# Patient Record
Sex: FEMALE | Race: ASIAN | NOT HISPANIC OR LATINO
[De-identification: names, ages, dates, MRNs, and addresses within clinical notes are randomized per-mention and may not be internally consistent; named-entity substitution may affect disease eponyms.]

---

## 2020-07-14 ENCOUNTER — RESULT REVIEW (OUTPATIENT)
Age: 25
End: 2020-07-14

## 2020-09-11 ENCOUNTER — OUTPATIENT (OUTPATIENT)
Dept: OUTPATIENT SERVICES | Facility: HOSPITAL | Age: 25
LOS: 1 days | End: 2020-09-11
Payer: COMMERCIAL

## 2020-09-11 ENCOUNTER — APPOINTMENT (OUTPATIENT)
Dept: ANTEPARTUM | Facility: CLINIC | Age: 25
End: 2020-09-11

## 2020-09-11 ENCOUNTER — EMERGENCY (EMERGENCY)
Facility: HOSPITAL | Age: 25
LOS: 1 days | Discharge: HOME | End: 2020-09-11
Attending: EMERGENCY MEDICINE
Payer: COMMERCIAL

## 2020-09-11 VITALS
HEART RATE: 82 BPM | HEIGHT: 67 IN | SYSTOLIC BLOOD PRESSURE: 132 MMHG | RESPIRATION RATE: 16 BRPM | WEIGHT: 164.91 LBS | DIASTOLIC BLOOD PRESSURE: 82 MMHG | TEMPERATURE: 98 F | OXYGEN SATURATION: 95 %

## 2020-09-11 VITALS
SYSTOLIC BLOOD PRESSURE: 111 MMHG | DIASTOLIC BLOOD PRESSURE: 69 MMHG | OXYGEN SATURATION: 99 % | RESPIRATION RATE: 18 BRPM | TEMPERATURE: 98 F | HEART RATE: 74 BPM

## 2020-09-11 DIAGNOSIS — O26.899 OTHER SPECIFIED PREGNANCY RELATED CONDITIONS, UNSPECIFIED TRIMESTER: ICD-10-CM

## 2020-09-11 DIAGNOSIS — O99.89 OTHER SPECIFIED DISEASES AND CONDITIONS COMPLICATING PREGNANCY, CHILDBIRTH AND THE PUERPERIUM: ICD-10-CM

## 2020-09-11 DIAGNOSIS — Z3A.00 WEEKS OF GESTATION OF PREGNANCY NOT SPECIFIED: ICD-10-CM

## 2020-09-11 LAB
ALBUMIN SERPL ELPH-MCNC: 4 G/DL — SIGNIFICANT CHANGE UP (ref 3.3–5)
ALP SERPL-CCNC: 42 U/L — SIGNIFICANT CHANGE UP (ref 40–120)
ALT FLD-CCNC: 9 U/L — LOW (ref 10–45)
ANION GAP SERPL CALC-SCNC: 13 MMOL/L — SIGNIFICANT CHANGE UP (ref 5–17)
APPEARANCE UR: ABNORMAL
AST SERPL-CCNC: 13 U/L — SIGNIFICANT CHANGE UP (ref 10–40)
BACTERIA # UR AUTO: NEGATIVE — SIGNIFICANT CHANGE UP
BASOPHILS # BLD AUTO: 0.03 K/UL — SIGNIFICANT CHANGE UP (ref 0–0.2)
BASOPHILS NFR BLD AUTO: 0.3 % — SIGNIFICANT CHANGE UP (ref 0–2)
BILIRUB SERPL-MCNC: 0.2 MG/DL — SIGNIFICANT CHANGE UP (ref 0.2–1.2)
BILIRUB UR-MCNC: NEGATIVE — SIGNIFICANT CHANGE UP
BLD GP AB SCN SERPL QL: NEGATIVE — SIGNIFICANT CHANGE UP
BUN SERPL-MCNC: 4 MG/DL — LOW (ref 7–23)
CALCIUM SERPL-MCNC: 9.4 MG/DL — SIGNIFICANT CHANGE UP (ref 8.4–10.5)
CHLORIDE SERPL-SCNC: 102 MMOL/L — SIGNIFICANT CHANGE UP (ref 96–108)
CO2 SERPL-SCNC: 21 MMOL/L — LOW (ref 22–31)
COLOR SPEC: SIGNIFICANT CHANGE UP
CREAT SERPL-MCNC: 0.43 MG/DL — LOW (ref 0.5–1.3)
DIFF PNL FLD: NEGATIVE — SIGNIFICANT CHANGE UP
EOSINOPHIL # BLD AUTO: 0.09 K/UL — SIGNIFICANT CHANGE UP (ref 0–0.5)
EOSINOPHIL NFR BLD AUTO: 0.9 % — SIGNIFICANT CHANGE UP (ref 0–6)
EPI CELLS # UR: 1 /HPF — SIGNIFICANT CHANGE UP
GLUCOSE SERPL-MCNC: 98 MG/DL — SIGNIFICANT CHANGE UP (ref 70–99)
GLUCOSE UR QL: NEGATIVE — SIGNIFICANT CHANGE UP
HCT VFR BLD CALC: 36 % — SIGNIFICANT CHANGE UP (ref 34.5–45)
HGB BLD-MCNC: 12.1 G/DL — SIGNIFICANT CHANGE UP (ref 11.5–15.5)
HYALINE CASTS # UR AUTO: 1 /LPF — SIGNIFICANT CHANGE UP (ref 0–2)
IMM GRANULOCYTES NFR BLD AUTO: 0.6 % — SIGNIFICANT CHANGE UP (ref 0–1.5)
KETONES UR-MCNC: NEGATIVE — SIGNIFICANT CHANGE UP
LEUKOCYTE ESTERASE UR-ACNC: NEGATIVE — SIGNIFICANT CHANGE UP
LYMPHOCYTES # BLD AUTO: 1.49 K/UL — SIGNIFICANT CHANGE UP (ref 1–3.3)
LYMPHOCYTES # BLD AUTO: 15.7 % — SIGNIFICANT CHANGE UP (ref 13–44)
MCHC RBC-ENTMCNC: 29.7 PG — SIGNIFICANT CHANGE UP (ref 27–34)
MCHC RBC-ENTMCNC: 33.6 GM/DL — SIGNIFICANT CHANGE UP (ref 32–36)
MCV RBC AUTO: 88.5 FL — SIGNIFICANT CHANGE UP (ref 80–100)
MONOCYTES # BLD AUTO: 0.77 K/UL — SIGNIFICANT CHANGE UP (ref 0–0.9)
MONOCYTES NFR BLD AUTO: 8.1 % — SIGNIFICANT CHANGE UP (ref 2–14)
NEUTROPHILS # BLD AUTO: 7.05 K/UL — SIGNIFICANT CHANGE UP (ref 1.8–7.4)
NEUTROPHILS NFR BLD AUTO: 74.4 % — SIGNIFICANT CHANGE UP (ref 43–77)
NITRITE UR-MCNC: NEGATIVE — SIGNIFICANT CHANGE UP
NRBC # BLD: 0 /100 WBCS — SIGNIFICANT CHANGE UP (ref 0–0)
PH UR: 7.5 — SIGNIFICANT CHANGE UP (ref 5–8)
PLATELET # BLD AUTO: 231 K/UL — SIGNIFICANT CHANGE UP (ref 150–400)
POTASSIUM SERPL-MCNC: 3.8 MMOL/L — SIGNIFICANT CHANGE UP (ref 3.5–5.3)
POTASSIUM SERPL-SCNC: 3.8 MMOL/L — SIGNIFICANT CHANGE UP (ref 3.5–5.3)
PROT SERPL-MCNC: 6.8 G/DL — SIGNIFICANT CHANGE UP (ref 6–8.3)
PROT UR-MCNC: NEGATIVE — SIGNIFICANT CHANGE UP
RBC # BLD: 4.07 M/UL — SIGNIFICANT CHANGE UP (ref 3.8–5.2)
RBC # FLD: 13.2 % — SIGNIFICANT CHANGE UP (ref 10.3–14.5)
RBC CASTS # UR COMP ASSIST: 1 /HPF — SIGNIFICANT CHANGE UP (ref 0–4)
RH IG SCN BLD-IMP: POSITIVE — SIGNIFICANT CHANGE UP
SODIUM SERPL-SCNC: 136 MMOL/L — SIGNIFICANT CHANGE UP (ref 135–145)
SP GR SPEC: 1.01 — SIGNIFICANT CHANGE UP (ref 1.01–1.02)
UROBILINOGEN FLD QL: NEGATIVE — SIGNIFICANT CHANGE UP
WBC # BLD: 9.49 K/UL — SIGNIFICANT CHANGE UP (ref 3.8–10.5)
WBC # FLD AUTO: 9.49 K/UL — SIGNIFICANT CHANGE UP (ref 3.8–10.5)
WBC UR QL: 2 /HPF — SIGNIFICANT CHANGE UP (ref 0–5)

## 2020-09-11 PROCEDURE — 85025 COMPLETE CBC W/AUTO DIFF WBC: CPT

## 2020-09-11 PROCEDURE — 80053 COMPREHEN METABOLIC PANEL: CPT

## 2020-09-11 PROCEDURE — 86900 BLOOD TYPING SEROLOGIC ABO: CPT

## 2020-09-11 PROCEDURE — G0463: CPT

## 2020-09-11 PROCEDURE — 99285 EMERGENCY DEPT VISIT HI MDM: CPT

## 2020-09-11 PROCEDURE — 76805 OB US >/= 14 WKS SNGL FETUS: CPT

## 2020-09-11 PROCEDURE — 81001 URINALYSIS AUTO W/SCOPE: CPT

## 2020-09-11 PROCEDURE — 76805 OB US >/= 14 WKS SNGL FETUS: CPT | Mod: 26

## 2020-09-11 PROCEDURE — 99284 EMERGENCY DEPT VISIT MOD MDM: CPT | Mod: 25

## 2020-09-11 PROCEDURE — 87086 URINE CULTURE/COLONY COUNT: CPT

## 2020-09-11 PROCEDURE — 86850 RBC ANTIBODY SCREEN: CPT

## 2020-09-11 PROCEDURE — 86901 BLOOD TYPING SEROLOGIC RH(D): CPT

## 2020-09-11 RX ORDER — INDOMETHACIN 50 MG
1 CAPSULE ORAL
Qty: 8 | Refills: 0
Start: 2020-09-11 | End: 2020-09-12

## 2020-09-11 NOTE — ED ADULT NURSE NOTE - OBJECTIVE STATEMENT
25 year old female presenting to ED c/o abdominal pain. Pt A+Ox3, moves all four extremities, 17 weeks pregnant. Pt reports headache and 3/10 intermittent lower abdominal pain since 10pm last night, with waves of worsening pain. Pt denies taking pain medication or experiencing this pain in the past; reports movement exacerbates the pain, and sitting up alleviates it. Pt denies dizziness/lightheadedness, chest pain, SOB, N/V, urinary or bowel symptoms, fevers or chills. 25 year old female presenting to ED c/o abdominal pain. Pt A+Ox3, moves all four extremities, 17 weeks pregnant. Pt reports headache, pain while urinating, and 3/10 intermittent lower abdominal pain since 10pm last night, with waves of worsening pain. Pt denies taking pain medication or experiencing this pain in the past; reports movement exacerbates the pain, and sitting up alleviates it. Upon assessment, lowe abdomen/bladder tender to palpation. Pt denies dizziness/lightheadedness, chest pain, SOB, N/V, urinary or bowel symptoms, fevers or chills.

## 2020-09-11 NOTE — ED PROVIDER NOTE - PHYSICAL EXAMINATION
GENERAL: A&Ox3, non-toxic appearing, no acute distress  HEENT: NCAT, EOMI, oral mucosa moist, normal conjunctiva  RESP: CTAB, no respiratory distress, no wheezes/rhonchi/rales, speaking in full sentences  CV: RRR, no murmurs/rubs/gallops  ABDOMEN: soft, suprapubic discomfort, non-distended, no guarding  MSK: no visible deformities  NEURO: no focal sensory or motor deficits, MAEx4, steady gait  SKIN: warm, normal color, well perfused, no rash  PSYCH: normal affect    -Reinaldo Cooley, PGY2

## 2020-09-11 NOTE — ED ADULT NURSE NOTE - NS ED NURSE NOTE DISPO AOU DETAILS FT
patient d/c from ED and to be sent to L&D for cervical lengthening measurements. report given to Christa Rajput RN in L&D.
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
## 2020-09-11 NOTE — ED PROVIDER NOTE - PATIENT PORTAL LINK FT
You can access the FollowMyHealth Patient Portal offered by [facility] by registering at the following website: [email or web address]. By joining [employer]’s FollowMyHealth portal, you will also be able to view your health information using other applications (apps) compatible with our system.
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
## 2020-09-11 NOTE — ED PROVIDER NOTE - NSFOLLOWUPINSTRUCTIONS_ED_ALL_ED_FT
You were seen in the ED for abdominal pain. You underwent a transvaginal ultrasound and assessment by the OBGYN doctors, who recommended that you be transferred to the Labor and Delivery for further assessment and monitoring. You were seen in the ED for abdominal pain. You underwent a transvaginal ultrasound and assessment by the OBGYN doctors, who recommended that you be transferred to the Labor and Delivery service for further assessment and monitoring.

## 2020-09-11 NOTE — ED ADULT TRIAGE NOTE - CHIEF COMPLAINT QUOTE
c/o abdominal cramping, no bleeding; 17 weeks pregnant Island Pedicle Flap-Requiring Vessel Identification Text: The defect edges were debeveled with a #15 scalpel blade.  Given the location of the defect, shape of the defect and the proximity to free margins an island pedicle advancement flap was deemed most appropriate.  Using a sterile surgical marker, an appropriate advancement flap was drawn, based on the axial vessel mentioned above, incorporating the defect, outlining the appropriate donor tissue and placing the expected incisions within the relaxed skin tension lines where possible.    The area thus outlined was incised deep to adipose tissue with a #15 scalpel blade.  The skin margins were undermined to an appropriate distance in all directions around the primary defect and laterally outward around the island pedicle utilizing iris scissors.  There was minimal undermining beneath the pedicle flap.
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
## 2020-09-11 NOTE — CONSULT NOTE ADULT - PROBLEM SELECTOR RECOMMENDATION 9
-patient counseled on risk vs benefits of intervention at pre-viable gestational ages  -follow up with final recs pending Sonogram    Daniel Kirkland, PGY2 -patient counseled on risk vs benefits of intervention at pre-viable gestational ages  -recommend sending patient to L&D Triage for cervical length and tocometry    d/w Dr. Marc Kirkland, PGY2

## 2020-09-11 NOTE — ED PROVIDER NOTE - NS ED ROS FT
GENERAL: no fever, no chills  EYES: no change in vision, no irritation  HEENT: no trouble swallowing or speaking  CARDIAC: no chest pain, no palpitations   PULMONARY: no cough, no shortness of breath, no wheezing  GI: +abdominal pain, no nausea, no vomiting, no diarrhea, no constipation  : no changes in urination, +dysuria, no frequency, no hematuria, no discharge  SKIN: no rashes  NEURO: no headache, no numbness, no weakness  MSK: no joint pain, no muscle pain, no back pain, no calf pain     -Reinaldo Cooley, PGY2

## 2020-09-12 LAB
CULTURE RESULTS: NO GROWTH — SIGNIFICANT CHANGE UP
SPECIMEN SOURCE: SIGNIFICANT CHANGE UP

## 2020-10-02 PROBLEM — Z00.00 ENCOUNTER FOR PREVENTIVE HEALTH EXAMINATION: Status: ACTIVE | Noted: 2020-10-02

## 2020-10-06 ENCOUNTER — APPOINTMENT (OUTPATIENT)
Dept: ANTEPARTUM | Facility: CLINIC | Age: 25
End: 2020-10-06
Payer: COMMERCIAL

## 2020-10-06 ENCOUNTER — ASOB RESULT (OUTPATIENT)
Age: 25
End: 2020-10-06

## 2020-10-06 ENCOUNTER — APPOINTMENT (OUTPATIENT)
Dept: ANTEPARTUM | Facility: CLINIC | Age: 25
End: 2020-10-06
Payer: MEDICARE

## 2020-10-06 PROCEDURE — 99241 OFFICE CONSULTATION NEW/ESTAB PATIENT 15 MIN: CPT | Mod: 25

## 2020-10-06 PROCEDURE — 76817 TRANSVAGINAL US OBSTETRIC: CPT

## 2020-10-06 PROCEDURE — 76811 OB US DETAILED SNGL FETUS: CPT

## 2020-12-24 ENCOUNTER — ASOB RESULT (OUTPATIENT)
Age: 25
End: 2020-12-24

## 2020-12-24 ENCOUNTER — TRANSCRIPTION ENCOUNTER (OUTPATIENT)
Age: 25
End: 2020-12-24

## 2020-12-24 ENCOUNTER — APPOINTMENT (OUTPATIENT)
Dept: MATERNAL FETAL MEDICINE | Facility: CLINIC | Age: 25
End: 2020-12-24
Payer: MEDICARE

## 2020-12-24 PROCEDURE — G0108 DIAB MANAGE TRN  PER INDIV: CPT | Mod: 95

## 2020-12-24 RX ORDER — BLOOD-GLUCOSE METER
KIT MISCELLANEOUS
Qty: 2 | Refills: 0 | Status: ACTIVE | COMMUNITY
Start: 2020-12-24 | End: 1900-01-01

## 2020-12-24 RX ORDER — BLOOD-GLUCOSE METER
W/DEVICE KIT MISCELLANEOUS
Qty: 1 | Refills: 0 | Status: ACTIVE | COMMUNITY
Start: 2020-12-24 | End: 1900-01-01

## 2020-12-24 RX ORDER — URINE ACETONE TEST STRIPS
STRIP MISCELLANEOUS
Qty: 1 | Refills: 0 | Status: ACTIVE | COMMUNITY
Start: 2020-12-24 | End: 1900-01-01

## 2020-12-24 RX ORDER — LANCETS 33 GAUGE
EACH MISCELLANEOUS
Qty: 1 | Refills: 5 | Status: ACTIVE | COMMUNITY
Start: 2020-12-24 | End: 1900-01-01

## 2020-12-30 ENCOUNTER — APPOINTMENT (OUTPATIENT)
Dept: MATERNAL FETAL MEDICINE | Facility: CLINIC | Age: 25
End: 2020-12-30

## 2021-01-04 ENCOUNTER — APPOINTMENT (OUTPATIENT)
Dept: MATERNAL FETAL MEDICINE | Facility: CLINIC | Age: 26
End: 2021-01-04

## 2021-01-04 ENCOUNTER — NON-APPOINTMENT (OUTPATIENT)
Age: 26
End: 2021-01-04

## 2021-01-07 ENCOUNTER — APPOINTMENT (OUTPATIENT)
Dept: MATERNAL FETAL MEDICINE | Facility: CLINIC | Age: 26
End: 2021-01-07

## 2021-01-14 ENCOUNTER — APPOINTMENT (OUTPATIENT)
Dept: MATERNAL FETAL MEDICINE | Facility: CLINIC | Age: 26
End: 2021-01-14
Payer: MEDICARE

## 2021-01-14 ENCOUNTER — ASOB RESULT (OUTPATIENT)
Age: 26
End: 2021-01-14

## 2021-01-14 PROCEDURE — G0108 DIAB MANAGE TRN  PER INDIV: CPT | Mod: 95

## 2021-01-20 ENCOUNTER — LABORATORY RESULT (OUTPATIENT)
Age: 26
End: 2021-01-20

## 2021-01-22 DIAGNOSIS — Z80.51 FAMILY HISTORY OF MALIGNANT NEOPLASM OF KIDNEY: ICD-10-CM

## 2021-01-22 DIAGNOSIS — O36.5990 MATERNAL CARE FOR OTHER KNOWN OR SUSPECTED POOR FETAL GROWTH, UNSPECIFIED TRIMESTER, NOT APPLICABLE OR UNSPECIFIED: ICD-10-CM

## 2021-01-22 DIAGNOSIS — Z78.9 OTHER SPECIFIED HEALTH STATUS: ICD-10-CM

## 2021-01-22 DIAGNOSIS — Z83.3 FAMILY HISTORY OF DIABETES MELLITUS: ICD-10-CM

## 2021-01-22 DIAGNOSIS — O9A.213: ICD-10-CM

## 2021-01-22 DIAGNOSIS — Z82.49 FAMILY HISTORY OF ISCHEMIC HEART DISEASE AND OTHER DISEASES OF THE CIRCULATORY SYSTEM: ICD-10-CM

## 2021-01-22 LAB — CYTOLOGY CVX/VAG DOC THIN PREP: NORMAL

## 2021-01-22 RX ORDER — DOCONEXENT, NIACINAMIDE, .ALPHA.-TOCOPHEROL ACETATE, DL-, CHOLECALCIFEROL, .BETA.-CAROTENE, ASCORBIC ACID, THIAMINE MONONITRATE, RIBOFLAVIN, PYRIDOXINE HYDROCHLORIDE, CYANOCOBALAMIN, IRON, ZINC OXIDE, CUPRIC OXIDE, POTASSIUM IODIDE, MAGNESIUM OXIDE, FOLIC ACID, AND LEVOMEFOLATE CALCIUM 200; 15; 20; 1000; 1100; 30; 1.6; 1.8; 2.5; 12; 29; 25; 2; 150; 20; .4; .6 MG/1; MG/1; [IU]/1; [IU]/1; [IU]/1; MG/1; MG/1; MG/1; MG/1; UG/1; MG/1; MG/1; MG/1; UG/1; MG/1; MG/1; MG/1
29-0.6-0.4-2 CAPSULE, LIQUID FILLED ORAL
Refills: 0 | Status: ACTIVE | COMMUNITY

## 2021-01-25 ENCOUNTER — NON-APPOINTMENT (OUTPATIENT)
Age: 26
End: 2021-01-25

## 2021-01-26 ENCOUNTER — APPOINTMENT (OUTPATIENT)
Dept: OBGYN | Facility: CLINIC | Age: 26
End: 2021-01-26
Payer: COMMERCIAL

## 2021-01-26 ENCOUNTER — ASOB RESULT (OUTPATIENT)
Age: 26
End: 2021-01-26

## 2021-01-26 ENCOUNTER — APPOINTMENT (OUTPATIENT)
Dept: OBGYN | Facility: CLINIC | Age: 26
End: 2021-01-26
Payer: MEDICARE

## 2021-01-26 PROCEDURE — 76819 FETAL BIOPHYS PROFIL W/O NST: CPT

## 2021-01-26 PROCEDURE — 0502F SUBSEQUENT PRENATAL CARE: CPT

## 2021-01-26 PROCEDURE — 99072 ADDL SUPL MATRL&STAF TM PHE: CPT

## 2021-01-28 ENCOUNTER — APPOINTMENT (OUTPATIENT)
Dept: MATERNAL FETAL MEDICINE | Facility: CLINIC | Age: 26
End: 2021-01-28
Payer: MEDICARE

## 2021-01-28 PROCEDURE — G0108 DIAB MANAGE TRN  PER INDIV: CPT | Mod: 95

## 2021-02-02 ENCOUNTER — APPOINTMENT (OUTPATIENT)
Dept: OBGYN | Facility: CLINIC | Age: 26
End: 2021-02-02
Payer: MEDICARE

## 2021-02-02 ENCOUNTER — ASOB RESULT (OUTPATIENT)
Age: 26
End: 2021-02-02

## 2021-02-02 PROBLEM — O24.419 GESTATIONAL DIABETES MELLITUS (GDM) AFFECTING PREGNANCY, ANTEPARTUM: Status: ACTIVE | Noted: 2020-12-24

## 2021-02-02 PROBLEM — Z34.90 CURRENTLY PREGNANT: Status: ACTIVE | Noted: 2021-01-22

## 2021-02-02 LAB
BILIRUB UR QL STRIP: NORMAL
CLARITY UR: CLEAR
COLLECTION METHOD: NORMAL
GLUCOSE UR-MCNC: NORMAL
HCG UR QL: NORMAL EU/DL
HGB UR QL STRIP.AUTO: NORMAL
KETONES UR-MCNC: NORMAL
LEUKOCYTE ESTERASE UR QL STRIP: NORMAL
NITRITE UR QL STRIP: NORMAL
PH UR STRIP: NORMAL
PROT UR STRIP-MCNC: NORMAL
SP GR UR STRIP: NORMAL

## 2021-02-02 PROCEDURE — 76819 FETAL BIOPHYS PROFIL W/O NST: CPT

## 2021-02-02 PROCEDURE — 81003 URINALYSIS AUTO W/O SCOPE: CPT | Mod: QW

## 2021-02-02 PROCEDURE — 0502F SUBSEQUENT PRENATAL CARE: CPT

## 2021-02-08 ENCOUNTER — APPOINTMENT (OUTPATIENT)
Dept: OBGYN | Facility: CLINIC | Age: 26
End: 2021-02-08
Payer: MEDICARE

## 2021-02-08 ENCOUNTER — ASOB RESULT (OUTPATIENT)
Age: 26
End: 2021-02-08

## 2021-02-08 ENCOUNTER — RESULT CHARGE (OUTPATIENT)
Age: 26
End: 2021-02-08

## 2021-02-08 VITALS
DIASTOLIC BLOOD PRESSURE: 72 MMHG | BODY MASS INDEX: 28.25 KG/M2 | WEIGHT: 180 LBS | SYSTOLIC BLOOD PRESSURE: 120 MMHG | HEIGHT: 67 IN

## 2021-02-08 DIAGNOSIS — Z34.90 ENCOUNTER FOR SUPERVISION OF NORMAL PREGNANCY, UNSPECIFIED, UNSPECIFIED TRIMESTER: ICD-10-CM

## 2021-02-08 DIAGNOSIS — O24.419 GESTATIONAL DIABETES MELLITUS IN PREGNANCY, UNSPECIFIED CONTROL: ICD-10-CM

## 2021-02-08 LAB
BILIRUB UR QL STRIP: NORMAL
CLARITY UR: CLEAR
COLLECTION METHOD: NORMAL
GLUCOSE UR-MCNC: NORMAL
HCG UR QL: 0.2 EU/DL
HGB UR QL STRIP.AUTO: NORMAL
KETONES UR-MCNC: NORMAL
LEUKOCYTE ESTERASE UR QL STRIP: NORMAL
NITRITE UR QL STRIP: NORMAL
PH UR STRIP: 7
PROT UR STRIP-MCNC: NORMAL
SP GR UR STRIP: 1.02

## 2021-02-08 PROCEDURE — 59025 FETAL NON-STRESS TEST: CPT

## 2021-02-08 PROCEDURE — 81003 URINALYSIS AUTO W/O SCOPE: CPT | Mod: QW

## 2021-02-08 PROCEDURE — 99072 ADDL SUPL MATRL&STAF TM PHE: CPT

## 2021-02-08 PROCEDURE — 0502F SUBSEQUENT PRENATAL CARE: CPT

## 2021-02-12 ENCOUNTER — INPATIENT (INPATIENT)
Facility: HOSPITAL | Age: 26
LOS: 1 days | Discharge: HOME | End: 2021-02-14
Attending: OBSTETRICS & GYNECOLOGY | Admitting: OBSTETRICS & GYNECOLOGY
Payer: COMMERCIAL

## 2021-02-12 VITALS
RESPIRATION RATE: 18 BRPM | TEMPERATURE: 99 F | DIASTOLIC BLOOD PRESSURE: 58 MMHG | OXYGEN SATURATION: 97 % | HEART RATE: 78 BPM | SYSTOLIC BLOOD PRESSURE: 108 MMHG

## 2021-02-12 DIAGNOSIS — Z34.80 ENCOUNTER FOR SUPERVISION OF OTHER NORMAL PREGNANCY, UNSPECIFIED TRIMESTER: ICD-10-CM

## 2021-02-12 DIAGNOSIS — Z3A.00 WEEKS OF GESTATION OF PREGNANCY NOT SPECIFIED: ICD-10-CM

## 2021-02-12 DIAGNOSIS — O26.899 OTHER SPECIFIED PREGNANCY RELATED CONDITIONS, UNSPECIFIED TRIMESTER: ICD-10-CM

## 2021-02-12 LAB
BASOPHILS # BLD AUTO: 0.01 K/UL — SIGNIFICANT CHANGE UP (ref 0–0.2)
BASOPHILS NFR BLD AUTO: 0.1 % — SIGNIFICANT CHANGE UP (ref 0–2)
EOSINOPHIL # BLD AUTO: 0.02 K/UL — SIGNIFICANT CHANGE UP (ref 0–0.5)
EOSINOPHIL NFR BLD AUTO: 0.2 % — SIGNIFICANT CHANGE UP (ref 0–6)
HCT VFR BLD CALC: 36.7 % — SIGNIFICANT CHANGE UP (ref 34.5–45)
HGB BLD-MCNC: 12.5 G/DL — SIGNIFICANT CHANGE UP (ref 11.5–15.5)
IMM GRANULOCYTES NFR BLD AUTO: 0.4 % — SIGNIFICANT CHANGE UP (ref 0–1.5)
LYMPHOCYTES # BLD AUTO: 0.83 K/UL — LOW (ref 1–3.3)
LYMPHOCYTES # BLD AUTO: 7.4 % — LOW (ref 13–44)
MCHC RBC-ENTMCNC: 29.9 PG — SIGNIFICANT CHANGE UP (ref 27–34)
MCHC RBC-ENTMCNC: 34.1 GM/DL — SIGNIFICANT CHANGE UP (ref 32–36)
MCV RBC AUTO: 87.8 FL — SIGNIFICANT CHANGE UP (ref 80–100)
MONOCYTES # BLD AUTO: 0.56 K/UL — SIGNIFICANT CHANGE UP (ref 0–0.9)
MONOCYTES NFR BLD AUTO: 5 % — SIGNIFICANT CHANGE UP (ref 2–14)
NEUTROPHILS # BLD AUTO: 9.7 K/UL — HIGH (ref 1.8–7.4)
NEUTROPHILS NFR BLD AUTO: 86.9 % — HIGH (ref 43–77)
NRBC # BLD: 0 /100 WBCS — SIGNIFICANT CHANGE UP (ref 0–0)
PLATELET # BLD AUTO: 218 K/UL — SIGNIFICANT CHANGE UP (ref 150–400)
RBC # BLD: 4.18 M/UL — SIGNIFICANT CHANGE UP (ref 3.8–5.2)
RBC # FLD: 13.2 % — SIGNIFICANT CHANGE UP (ref 10.3–14.5)
SARS-COV-2 RNA SPEC QL NAA+PROBE: SIGNIFICANT CHANGE UP
WBC # BLD: 11.17 K/UL — HIGH (ref 3.8–10.5)
WBC # FLD AUTO: 11.17 K/UL — HIGH (ref 3.8–10.5)

## 2021-02-12 RX ORDER — OXYTOCIN 10 UNIT/ML
333.33 VIAL (ML) INJECTION
Qty: 20 | Refills: 0 | Status: DISCONTINUED | OUTPATIENT
Start: 2021-02-12 | End: 2021-02-14

## 2021-02-12 RX ORDER — SODIUM CHLORIDE 9 MG/ML
1000 INJECTION, SOLUTION INTRAVENOUS
Refills: 0 | Status: DISCONTINUED | OUTPATIENT
Start: 2021-02-12 | End: 2021-02-13

## 2021-02-12 RX ORDER — CITRIC ACID/SODIUM CITRATE 300-500 MG
15 SOLUTION, ORAL ORAL EVERY 6 HOURS
Refills: 0 | Status: DISCONTINUED | OUTPATIENT
Start: 2021-02-12 | End: 2021-02-13

## 2021-02-12 RX ORDER — SODIUM CHLORIDE 9 MG/ML
1000 INJECTION INTRAMUSCULAR; INTRAVENOUS; SUBCUTANEOUS
Refills: 0 | Status: DISCONTINUED | OUTPATIENT
Start: 2021-02-12 | End: 2021-02-13

## 2021-02-12 NOTE — OB RN PATIENT PROFILE - CAREGIVER PHONE NUMBER
Abdomen soft, non-tender and non-distended, no rebound, no guarding and no masses. no hepatosplenomegaly.
80
[phone]

## 2021-02-12 NOTE — OB PROVIDER H&P - ASSESSMENT
A&P:     27 y/o  at 39w4d admitted for labor at term.     Labor: admit to L&D  -routine labs  -EFM/toco  -clear liquid, IV hydration  -FS q4 in latent labor, q2 in active labor  -epidural  - AROM after epidural    Fetal: cat 1 tracing, fetal status reassuring  GBS: neg      D/w Dr. Noni Zapata  PGY-1

## 2021-02-12 NOTE — OB RN PATIENT PROFILE - NSSUBSTANCEUSE_GEN_ALL_CORE_SD
Reviewed record in preparation for visit and have necessary documentation  Pt did not bring medication to office visit for review  Information was given to pt on Advanced Directives, Living Will  opportunity was given for questions never used

## 2021-02-12 NOTE — OB PROVIDER H&P - HISTORY OF PRESENT ILLNESS
R1 H&P    Pt is a 25 y/o  at 39w4d presenting with painful contractions since 12a, now every 4-5 minutes. Was checked in the office earlier this week and was 3cm dilated.  Patient denies  vaginal bleeding, LOF, FM felt.   Prenatal course complicated by GDMA1.  GBS negative  EFW 3000g    OBHx: G1 - current  GynHx: 5 cm fibroid  PMHx: denies  PSHx: denies  Med: PNV  All: NKDA  SH: denies t/a/d  Psych:denies    VS:  Vital Signs Last 24 Hrs  T(C): 37.1 (2021 21:51), Max: 37.1 (2021 21:16)  T(F): 98.8 (2021 21:51), Max: 98.8 (2021 21:16)  HR: 86 (2021 22:27) (74 - 97)  BP: 118/70 (2021 22:23) (105/58 - 118/70)  BP(mean): --  RR: 18 (2021 21:51) (18 - 18)  SpO2: 99% (2021 22:27) (97% - 100%)  GA: NAD  Cards: RRR  Pulm: CTAB  EFH: baseline***,moderate variability, +accels, -decels   [place]: irregular  VE: 5/90/-2  TAUS:vertex

## 2021-02-13 LAB
GLUCOSE BLDC GLUCOMTR-MCNC: 127 MG/DL — HIGH (ref 70–99)
GLUCOSE BLDC GLUCOMTR-MCNC: 98 MG/DL — SIGNIFICANT CHANGE UP (ref 70–99)
SARS-COV-2 IGG SERPL QL IA: NEGATIVE — SIGNIFICANT CHANGE UP
SARS-COV-2 IGM SERPL IA-ACNC: 0.07 INDEX — SIGNIFICANT CHANGE UP
T PALLIDUM AB TITR SER: NEGATIVE — SIGNIFICANT CHANGE UP

## 2021-02-13 RX ORDER — TETANUS TOXOID, REDUCED DIPHTHERIA TOXOID AND ACELLULAR PERTUSSIS VACCINE, ADSORBED 5; 2.5; 8; 8; 2.5 [IU]/.5ML; [IU]/.5ML; UG/.5ML; UG/.5ML; UG/.5ML
0.5 SUSPENSION INTRAMUSCULAR ONCE
Refills: 0 | Status: DISCONTINUED | OUTPATIENT
Start: 2021-02-13 | End: 2021-02-14

## 2021-02-13 RX ORDER — SIMETHICONE 80 MG/1
80 TABLET, CHEWABLE ORAL EVERY 4 HOURS
Refills: 0 | Status: DISCONTINUED | OUTPATIENT
Start: 2021-02-13 | End: 2021-02-14

## 2021-02-13 RX ORDER — BENZOCAINE 10 %
1 GEL (GRAM) MUCOUS MEMBRANE EVERY 6 HOURS
Refills: 0 | Status: DISCONTINUED | OUTPATIENT
Start: 2021-02-13 | End: 2021-02-14

## 2021-02-13 RX ORDER — IBUPROFEN 200 MG
600 TABLET ORAL EVERY 6 HOURS
Refills: 0 | Status: COMPLETED | OUTPATIENT
Start: 2021-02-13 | End: 2022-01-12

## 2021-02-13 RX ORDER — LANOLIN
1 OINTMENT (GRAM) TOPICAL EVERY 6 HOURS
Refills: 0 | Status: DISCONTINUED | OUTPATIENT
Start: 2021-02-13 | End: 2021-02-14

## 2021-02-13 RX ORDER — ACETAMINOPHEN 500 MG
975 TABLET ORAL
Refills: 0 | Status: DISCONTINUED | OUTPATIENT
Start: 2021-02-13 | End: 2021-02-14

## 2021-02-13 RX ORDER — OXYCODONE HYDROCHLORIDE 5 MG/1
5 TABLET ORAL
Refills: 0 | Status: DISCONTINUED | OUTPATIENT
Start: 2021-02-13 | End: 2021-02-14

## 2021-02-13 RX ORDER — AER TRAVELER 0.5 G/1
1 SOLUTION RECTAL; TOPICAL EVERY 4 HOURS
Refills: 0 | Status: DISCONTINUED | OUTPATIENT
Start: 2021-02-13 | End: 2021-02-14

## 2021-02-13 RX ORDER — IBUPROFEN 200 MG
600 TABLET ORAL EVERY 6 HOURS
Refills: 0 | Status: DISCONTINUED | OUTPATIENT
Start: 2021-02-13 | End: 2021-02-14

## 2021-02-13 RX ORDER — DIBUCAINE 1 %
1 OINTMENT (GRAM) RECTAL EVERY 6 HOURS
Refills: 0 | Status: DISCONTINUED | OUTPATIENT
Start: 2021-02-13 | End: 2021-02-14

## 2021-02-13 RX ORDER — PRAMOXINE HYDROCHLORIDE 150 MG/15G
1 AEROSOL, FOAM RECTAL EVERY 4 HOURS
Refills: 0 | Status: DISCONTINUED | OUTPATIENT
Start: 2021-02-13 | End: 2021-02-14

## 2021-02-13 RX ORDER — OXYCODONE HYDROCHLORIDE 5 MG/1
5 TABLET ORAL ONCE
Refills: 0 | Status: DISCONTINUED | OUTPATIENT
Start: 2021-02-13 | End: 2021-02-14

## 2021-02-13 RX ORDER — MAGNESIUM HYDROXIDE 400 MG/1
30 TABLET, CHEWABLE ORAL
Refills: 0 | Status: DISCONTINUED | OUTPATIENT
Start: 2021-02-13 | End: 2021-02-14

## 2021-02-13 RX ORDER — KETOROLAC TROMETHAMINE 30 MG/ML
30 SYRINGE (ML) INJECTION ONCE
Refills: 0 | Status: DISCONTINUED | OUTPATIENT
Start: 2021-02-13 | End: 2021-02-13

## 2021-02-13 RX ORDER — SODIUM CHLORIDE 9 MG/ML
3 INJECTION INTRAMUSCULAR; INTRAVENOUS; SUBCUTANEOUS EVERY 8 HOURS
Refills: 0 | Status: DISCONTINUED | OUTPATIENT
Start: 2021-02-13 | End: 2021-02-14

## 2021-02-13 RX ORDER — DIPHENHYDRAMINE HCL 50 MG
25 CAPSULE ORAL EVERY 6 HOURS
Refills: 0 | Status: DISCONTINUED | OUTPATIENT
Start: 2021-02-13 | End: 2021-02-14

## 2021-02-13 RX ORDER — OXYTOCIN 10 UNIT/ML
333.33 VIAL (ML) INJECTION
Qty: 20 | Refills: 0 | Status: DISCONTINUED | OUTPATIENT
Start: 2021-02-13 | End: 2021-02-14

## 2021-02-13 RX ORDER — HYDROCORTISONE 1 %
1 OINTMENT (GRAM) TOPICAL EVERY 6 HOURS
Refills: 0 | Status: DISCONTINUED | OUTPATIENT
Start: 2021-02-13 | End: 2021-02-14

## 2021-02-13 RX ADMIN — Medication 1 TABLET(S): at 12:03

## 2021-02-13 RX ADMIN — SODIUM CHLORIDE 3 MILLILITER(S): 9 INJECTION INTRAMUSCULAR; INTRAVENOUS; SUBCUTANEOUS at 06:02

## 2021-02-13 RX ADMIN — Medication 1000 MILLIUNIT(S)/MIN: at 06:44

## 2021-02-13 RX ADMIN — SODIUM CHLORIDE 3 MILLILITER(S): 9 INJECTION INTRAMUSCULAR; INTRAVENOUS; SUBCUTANEOUS at 14:38

## 2021-02-13 RX ADMIN — Medication 975 MILLIGRAM(S): at 08:56

## 2021-02-13 RX ADMIN — Medication 600 MILLIGRAM(S): at 17:37

## 2021-02-13 RX ADMIN — Medication 975 MILLIGRAM(S): at 15:06

## 2021-02-13 RX ADMIN — Medication 30 MILLIGRAM(S): at 05:48

## 2021-02-13 RX ADMIN — Medication 600 MILLIGRAM(S): at 12:03

## 2021-02-13 RX ADMIN — Medication 975 MILLIGRAM(S): at 20:06

## 2021-02-13 NOTE — OB PROVIDER LABOR PROGRESS NOTE - NS_SUBJECTIVE/OBJECTIVE_OBGYN_ALL_OB_FT
Patient re-evaluated for increased pressure    Vital Signs Last 24 Hrs  T(C): 36.7 (13 Feb 2021 01:17), Max: 37.1 (12 Feb 2021 21:16)  T(F): 98.06 (13 Feb 2021 01:17), Max: 98.8 (12 Feb 2021 21:16)  HR: 87 (13 Feb 2021 03:52) (68 - 99)  BP: 90/55 (13 Feb 2021 03:41) (82/45 - 126/56)  BP(mean): --  RR: 16 (13 Feb 2021 01:17) (16 - 18)  SpO2: 98% (13 Feb 2021 03:52) (97% - 100%)
OB PA Progress Note    Pt seen and evaluated for progress. Epidural in place, comfortable.  Exam unchanged at 5cm, AROM clear fluid.    Exam  VSS  SVE: 5/80/-2  EFM: 140bpm, moderate variability, +accels, -decels  [place]: readujsted pt reports ctx Q5min

## 2021-02-13 NOTE — OB RN DELIVERY SUMMARY - NS_SEPSISRSKCALC_OBGYN_ALL_OB_FT
EOS calculated successfully. EOS Risk Factor: 0.5/1000 live births ([facility] national incidence); GA=39w6d; Temp=98.8; ROM=5.167; GBS='Negative'; Antibiotics='No antibiotics or any antibiotics < 2 hrs prior to birth'

## 2021-02-13 NOTE — OB RN DELIVERY SUMMARY - BABY A: WEIGHT IN POUNDS (FROM GRAMS), DELIVERY
Please see below.  Patient was seen on 4/24/20 for a swollen lump in his right axilla.  He was given Bactrim.   6

## 2021-02-13 NOTE — PROGRESS NOTE ADULT - SUBJECTIVE AND OBJECTIVE BOX
JOSIE MCCOY  MRN-[id number]  26y    Subjective:  Doing well, no N/V, pain controlled, lochia wnl, due to void, vaginal bleeding wnl.    Vital Signs Last 24 Hrs  T(C): 36.8 (13 Feb 2021 08:10), Max: 37.7 (13 Feb 2021 05:05)  T(F): 98.2 (13 Feb 2021 08:10), Max: 99.9 (13 Feb 2021 05:05)  HR: 78 (13 Feb 2021 08:10) (68 - 99)  BP: 107/65 (13 Feb 2021 08:10) (82/45 - 126/56)  BP(mean): 76 (13 Feb 2021 06:35) (76 - 80)  RR: 18 (13 Feb 2021 08:10) (12 - 18)  SpO2: 97% (13 Feb 2021 08:10) (86% - 100%)    I&O's Summary    12 Feb 2021 07:01  -  13 Feb 2021 07:00  --------------------------------------------------------  IN: 3600 mL / OUT: 1650 mL / NET: 1950 mL                              12.5   11.17 )-----------( 218      ( 12 Feb 2021 22:06 )             36.7       Allergies    No Known Allergies    Intolerances        MEDICATIONS  (STANDING):  acetaminophen   Tablet .. 975 milliGRAM(s) Oral <User Schedule>  diphtheria/tetanus/pertussis (acellular) Vaccine (ADAcel) 0.5 milliLiter(s) IntraMuscular once  ibuprofen  Tablet. 600 milliGRAM(s) Oral every 6 hours  oxytocin Infusion 333.333 milliUNIT(s)/Min (1000 mL/Hr) IV Continuous <Continuous>  oxytocin Infusion 333.333 milliUNIT(s)/Min (1000 mL/Hr) IV Continuous <Continuous>  prenatal multivitamin 1 Tablet(s) Oral daily  sodium chloride 0.9% lock flush 3 milliLiter(s) IV Push every 8 hours    MEDICATIONS  (PRN):  benzocaine 20%/menthol 0.5% Spray 1 Spray(s) Topical every 6 hours PRN for Perineal discomfort  dibucaine 1% Ointment 1 Application(s) Topical every 6 hours PRN Perineal discomfort  diphenhydrAMINE 25 milliGRAM(s) Oral every 6 hours PRN Pruritus  hydrocortisone 1% Cream 1 Application(s) Topical every 6 hours PRN Moderate Pain (4-6)  lanolin Ointment 1 Application(s) Topical every 6 hours PRN nipple soreness  magnesium hydroxide Suspension 30 milliLiter(s) Oral two times a day PRN Constipation  oxyCODONE    IR 5 milliGRAM(s) Oral every 3 hours PRN Moderate to Severe Pain (4-10)  oxyCODONE    IR 5 milliGRAM(s) Oral once PRN Moderate to Severe Pain (4-10)  pramoxine 1%/zinc 5% Cream 1 Application(s) Topical every 4 hours PRN Moderate Pain (4-6)  simethicone 80 milliGRAM(s) Chew every 4 hours PRN Gas  witch hazel Pads 1 Application(s) Topical every 4 hours PRN Perineal discomfort      PHYSICAL EXAM:    Extremities: non-tender bilateraly  Abdomen: soft, nontender, fundus firm

## 2021-02-13 NOTE — OB PROVIDER LABOR PROGRESS NOTE - ASSESSMENT
A/P:  admitted in labor.  - AROM clear fluid  - Epidural in place  - Expectant management  - Dr. Noni Sky PA-C
25 y/o  @39w6d who presented in early labor, sp AROM@1130p  - kyle michael      D/w Dr. Cheney who is on her way    Viola Zapata  PGY-1

## 2021-02-13 NOTE — OB PROVIDER DELIVERY SUMMARY - NSPROVIDERDELIVERYNOTE_OBGYN_ALL_OB_FT
live female infant over rmle with spont Delivery of placenta. baby pink, vigourous and crying. apgars 9-9. uterus empty, rectum intact ebl 250cc pt to rrr stable

## 2021-02-14 ENCOUNTER — TRANSCRIPTION ENCOUNTER (OUTPATIENT)
Age: 26
End: 2021-02-14

## 2021-02-14 VITALS
RESPIRATION RATE: 18 BRPM | WEIGHT: 179.9 LBS | DIASTOLIC BLOOD PRESSURE: 65 MMHG | HEIGHT: 67 IN | HEART RATE: 77 BPM | OXYGEN SATURATION: 98 % | SYSTOLIC BLOOD PRESSURE: 104 MMHG | TEMPERATURE: 98 F

## 2021-02-14 PROCEDURE — 86900 BLOOD TYPING SEROLOGIC ABO: CPT

## 2021-02-14 PROCEDURE — 87635 SARS-COV-2 COVID-19 AMP PRB: CPT

## 2021-02-14 PROCEDURE — 59025 FETAL NON-STRESS TEST: CPT

## 2021-02-14 PROCEDURE — 86769 SARS-COV-2 COVID-19 ANTIBODY: CPT

## 2021-02-14 PROCEDURE — 59050 FETAL MONITOR W/REPORT: CPT

## 2021-02-14 PROCEDURE — U0005: CPT

## 2021-02-14 PROCEDURE — 82962 GLUCOSE BLOOD TEST: CPT

## 2021-02-14 PROCEDURE — G0463: CPT

## 2021-02-14 PROCEDURE — 86850 RBC ANTIBODY SCREEN: CPT

## 2021-02-14 PROCEDURE — 86901 BLOOD TYPING SEROLOGIC RH(D): CPT

## 2021-02-14 PROCEDURE — 85025 COMPLETE CBC W/AUTO DIFF WBC: CPT

## 2021-02-14 PROCEDURE — 86780 TREPONEMA PALLIDUM: CPT

## 2021-02-14 RX ORDER — ACETAMINOPHEN 500 MG
3 TABLET ORAL
Qty: 0 | Refills: 0 | DISCHARGE
Start: 2021-02-14

## 2021-02-14 RX ORDER — IBUPROFEN 200 MG
1 TABLET ORAL
Qty: 0 | Refills: 0 | DISCHARGE
Start: 2021-02-14

## 2021-02-14 RX ADMIN — Medication 600 MILLIGRAM(S): at 05:33

## 2021-02-14 RX ADMIN — Medication 975 MILLIGRAM(S): at 09:27

## 2021-02-14 RX ADMIN — Medication 1 TABLET(S): at 09:27

## 2021-02-14 RX ADMIN — Medication 975 MILLIGRAM(S): at 03:19

## 2021-02-14 RX ADMIN — Medication 600 MILLIGRAM(S): at 00:06

## 2021-02-14 NOTE — DISCHARGE NOTE OB - MEDICATION SUMMARY - MEDICATIONS TO TAKE
I will START or STAY ON the medications listed below when I get home from the hospital:    acetaminophen 325 mg oral tablet  -- 3 tab(s) by mouth   -- Indication: For Pain    ibuprofen 600 mg oral tablet  -- 1 tab(s) by mouth every 6 hours  -- Indication: For Pain    Prenatal Multivitamins with Folic Acid 1 mg oral tablet  -- 1 tab(s) by mouth once a day  -- Indication: For postpartum

## 2021-02-14 NOTE — DISCHARGE NOTE OB - PATIENT PORTAL LINK FT
You can access the FollowMyHealth Patient Portal offered by [facility] by registering at the following website: [email or web address]. By joining [employer]’s FollowMyHealth portal, you will also be able to view your health information using other applications (apps) compatible with our system.

## 2021-02-14 NOTE — PROGRESS NOTE ADULT - ASSESSMENT
PPD0 after vaginal delivery, doing well  Regular diet  Ambulation  PO analgesia    Kailee Lara MD
PPD1 doing well,    regular diet  PO analgesia  Discharge planning    Kailee Lara MD

## 2021-02-14 NOTE — DISCHARGE NOTE OB - CAREGIVER ADDRESS
Please call to schedule an appointment with Gastroenterology  [phone]  Located in [address]  [address] [address] [place] [address]        Please call to schedule an appointment with Urology  [phone]  Located in [address]  [address] [address] [place] [address]    Dr. Young ordered fasting labs for you.  You may go to any [facility] facility to complete, there is no appointment required.  · Please have labs done a week prior to your appointment or as discussed.  · Fast for 12-14 hours, you may drink water, plain coffee or tea.    · No alcohol for 72 hours prior to lab work.   For questions please call [phone]      Lab locations and Hours    [address] [facility] [place] [address]  [phone]   Mon- Fri 7:00am- 7:30pm  Sat 8:00am- 11:30am      [address] [place] [address]  [phone]  Mon- Fri 8:00am- 4:00pm      [address] [address] [place] [address]  [phone]  Mon-Thur 7:00am- 6:00pm  Fri 7:00am- 5:00pm  Sat 7:00am- 12:00pm      [address], [place] [address]  [phone]  Mon- Thur 8:00am- 6:00pm  Fri 8:00am - 5:00pm  Saturday hours by appointment only      [address] [place] [address]  [phone]  Mon-Tue 7:00am-6:30pm  Wed-Thur 7:00am- 4:30pm  Fri 7:00am- 3:30pm    [address] [address] [place] [address]  [phone]  (By Appointment Only)  Mon, Wed, Fri 7:30am-5:00pm  Tue 8:am- 7:00pm  Thur 7:30am- 7:00pm      [address] [place] [address]  [phone]  Mon-Fri 7:30am- 6:00pm  Sat 8:00am- 12:00pm        [address] [place] [address]  [phone]  Mon-Fri 8:00am- 5:00pm  Sat 8:00am- 12:00pm        [address] [place] [address]  [phone]  Mon-Fri 8:00am- 5:00pm  Sat 8:00am- 12:00pm  Sun 8:00am- 12:00pm      Patient Education     Atorvastatin Calcium Oral tablet  What is this medicine?  ATORVASTATIN (a TORE va sta tin) is known as a HMG-CoA reductase inhibitor or 'statin'. It lowers the level of  cholesterol and triglycerides in the blood. This drug may also reduce the risk of heart attack, stroke, or other health problems in patients with risk factors for heart disease. Diet and lifestyle changes are often used with this drug.  This medicine may be used for other purposes; ask your health care provider or pharmacist if you have questions.  What should I tell my health care provider before I take this medicine?  They need to know if you have any of these conditions:  · frequently drink alcoholic beverages  · history of stroke, TIA  · kidney disease  · liver disease  · muscle aches or weakness  · other medical condition  · an unusual or allergic reaction to atorvastatin, other medicines, foods, dyes, or preservatives  · pregnant or trying to get pregnant  · breast-feeding  How should I use this medicine?  Take this medicine by mouth with a glass of water. Follow the directions on the prescription label. You can take this medicine with or without food. Take your doses at regular intervals. Do not take your medicine more often than directed.  Talk to your pediatrician regarding the use of this medicine in children. While this drug may be prescribed for children as young as 10 years old for selected conditions, precautions do apply.  Overdosage: If you think you have taken too much of this medicine contact a poison control center or emergency room at once.  NOTE: This medicine is only for you. Do not share this medicine with others.  What if I miss a dose?  If you miss a dose, take it as soon as you can. If it is almost time for your next dose, take only that dose. Do not take double or extra doses.  What may interact with this medicine?  Do not take this medicine with any of the following medications:  · red yeast rice  · telaprevir  · telithromycin  · voriconazole  This medicine may also interact with the following medications:  · alcohol  · antiviral medicines for HIV or AIDS  · boceprevir  · certain  antibiotics like clarithromycin, erythromycin, troleandomycin  · certain medicines for cholesterol like fenofibrate or gemfibrozil  · cimetidine  · clarithromycin  · colchicine  · cyclosporine  · digoxin  · female hormones, like estrogens or progestins and birth control pills  · grapefruit juice  · medicines for fungal infections like fluconazole, itraconazole, ketoconazole  · niacin  · rifampin  · spironolactone  This list may not describe all possible interactions. Give your health care provider a list of all the medicines, herbs, non-prescription drugs, or dietary supplements you use. Also tell them if you smoke, drink alcohol, or use illegal drugs. Some items may interact with your medicine.  What should I watch for while using this medicine?  Visit your doctor or health care professional for regular check-ups. You may need regular tests to make sure your liver is working properly.  Tell your doctor or health care professional right away if you get any unexplained muscle pain, tenderness, or weakness, especially if you also have a fever and tiredness. Your doctor or health care professional may tell you to stop taking this medicine if you develop muscle problems. If your muscle problems do not go away after stopping this medicine, contact your health care professional.  This drug is only part of a total heart-health program. Your doctor or a dietician can suggest a low-cholesterol and low-fat diet to help. Avoid alcohol and smoking, and keep a proper exercise schedule.  Do not use this drug if you are pregnant or breast-feeding. Serious side effects to an unborn child or to an infant are possible. Talk to your doctor or pharmacist for more information.  This medicine may affect blood sugar levels. If you have diabetes, check with your doctor or health care professional before you change your diet or the dose of your diabetic medicine.  If you are going to have surgery tell your health care professional that you  are taking this drug.  What side effects may I notice from receiving this medicine?  Side effects that you should report to your doctor or health care professional as soon as possible:  · allergic reactions like skin rash, itching or hives, swelling of the face, lips, or tongue  · dark urine  · fever  · joint pain  · muscle cramps, pain  · redness, blistering, peeling or loosening of the skin, including inside the mouth  · trouble passing urine or change in the amount of urine  · unusually weak or tired  · yellowing of eyes or skin  Side effects that usually do not require medical attention (report to your doctor or health care professional if they continue or are bothersome):  · constipation  · heartburn  · stomach gas, pain, upset  This list may not describe all possible side effects. Call your doctor for medical advice about side effects. You may report side effects to FDA at 5-820-FDA-5693.  Where should I keep my medicine?  Keep out of the reach of children.  Store at room temperature between 20 to 25 degrees C (68 to 77 degrees F). Throw away any unused medicine after the expiration date.  NOTE:This sheet is a summary. It may not cover all possible information. If you have questions about this medicine, talk to your doctor, pharmacist, or health care provider. Copyright© 2016 [employer]            same as pt

## 2021-02-14 NOTE — DISCHARGE NOTE OB - MATERIALS PROVIDED
[facility] Reader Screening Program/Breastfeeding Log/Bottle Feeding Log/Breastfeeding Mother’s Support Group Information/Guide to Postpartum Care/[facility] Hearing Screen Program/Back To Sleep Handout/Shaken Baby Prevention Handout/Breastfeeding Guide and Packet/Birth Certificate Instructions [facility] Screening Program/[place]  Immunization Record/Breastfeeding Log/Bottle Feeding Log/Breastfeeding Mother’s Support Group Information/Guide to Postpartum Care/[facility] Hearing Screen Program/Back To Sleep Handout/Shaken Baby Prevention Handout/Breastfeeding Guide and Packet/Birth Certificate Instructions/Discharge Medication Information for Patients and Families Pocket Guide

## 2021-02-14 NOTE — DISCHARGE NOTE OB - CARE PLAN
Principal Discharge DX:	Vaginal delivery  Goal:	Rest  Assessment and plan of treatment:	Please call the office to schedule a 6 weeks postpartum appointment.

## 2021-02-14 NOTE — PROGRESS NOTE ADULT - SUBJECTIVE AND OBJECTIVE BOX
JOSIE MCCOY  MRN-[id number]  26y    Subjective:  Pt feels well.  She is ambulating, Tolerating diet, lochia wnl,   Vital Signs Last 24 Hrs  T(C): 36.7 (14 Feb 2021 05:00), Max: 36.7 (13 Feb 2021 17:45)  T(F): 98 (14 Feb 2021 05:00), Max: 98.1 (13 Feb 2021 17:45)  HR: 90 (14 Feb 2021 05:00) (90 - 91)  BP: 92/60 (14 Feb 2021 05:00) (92/60 - 98/60)  BP(mean): --  RR: 18 (14 Feb 2021 05:00) (18 - 18)  SpO2: 99% (13 Feb 2021 17:45) (99% - 99%)    I&O's Summary    13 Feb 2021 07:01  -  14 Feb 2021 07:00  --------------------------------------------------------  IN: 0 mL / OUT: 1800 mL / NET: -1800 mL                              12.5   11.17 )-----------( 218      ( 12 Feb 2021 22:06 )             36.7       Allergies    No Known Allergies    Intolerances        MEDICATIONS  (STANDING):  acetaminophen   Tablet .. 975 milliGRAM(s) Oral <User Schedule>  diphtheria/tetanus/pertussis (acellular) Vaccine (ADAcel) 0.5 milliLiter(s) IntraMuscular once  ibuprofen  Tablet. 600 milliGRAM(s) Oral every 6 hours  oxytocin Infusion 333.333 milliUNIT(s)/Min (1000 mL/Hr) IV Continuous <Continuous>  oxytocin Infusion 333.333 milliUNIT(s)/Min (1000 mL/Hr) IV Continuous <Continuous>  prenatal multivitamin 1 Tablet(s) Oral daily    MEDICATIONS  (PRN):  benzocaine 20%/menthol 0.5% Spray 1 Spray(s) Topical every 6 hours PRN for Perineal discomfort  dibucaine 1% Ointment 1 Application(s) Topical every 6 hours PRN Perineal discomfort  diphenhydrAMINE 25 milliGRAM(s) Oral every 6 hours PRN Pruritus  hydrocortisone 1% Cream 1 Application(s) Topical every 6 hours PRN Moderate Pain (4-6)  lanolin Ointment 1 Application(s) Topical every 6 hours PRN nipple soreness  magnesium hydroxide Suspension 30 milliLiter(s) Oral two times a day PRN Constipation  oxyCODONE    IR 5 milliGRAM(s) Oral every 3 hours PRN Moderate to Severe Pain (4-10)  oxyCODONE    IR 5 milliGRAM(s) Oral once PRN Moderate to Severe Pain (4-10)  pramoxine 1%/zinc 5% Cream 1 Application(s) Topical every 4 hours PRN Moderate Pain (4-6)  simethicone 80 milliGRAM(s) Chew every 4 hours PRN Gas  witch hazel Pads 1 Application(s) Topical every 4 hours PRN Perineal discomfort      PHYSICAL EXAM:    Extremities: non-tender bilateraly  Abdomen: soft, nontender, fundus firm, incision clean, dry and intact  Pelvis: deferred                 JSOIE MCCOY  MRN-[id number]  26y    Subjective:  Pt feels well.  She is ambulating, Tolerating diet, lochia wnl, pain well controlled. Feels well.  Vital Signs Last 24 Hrs  T(C): 36.7 (14 Feb 2021 05:00), Max: 36.7 (13 Feb 2021 17:45)  T(F): 98 (14 Feb 2021 05:00), Max: 98.1 (13 Feb 2021 17:45)  HR: 90 (14 Feb 2021 05:00) (90 - 91)  BP: 92/60 (14 Feb 2021 05:00) (92/60 - 98/60)  BP(mean): --  RR: 18 (14 Feb 2021 05:00) (18 - 18)  SpO2: 99% (13 Feb 2021 17:45) (99% - 99%)    I&O's Summary    13 Feb 2021 07:01  -  14 Feb 2021 07:00  --------------------------------------------------------  IN: 0 mL / OUT: 1800 mL / NET: -1800 mL                              12.5   11.17 )-----------( 218      ( 12 Feb 2021 22:06 )             36.7       Allergies    No Known Allergies    Intolerances        MEDICATIONS  (STANDING):  acetaminophen   Tablet .. 975 milliGRAM(s) Oral <User Schedule>  diphtheria/tetanus/pertussis (acellular) Vaccine (ADAcel) 0.5 milliLiter(s) IntraMuscular once  ibuprofen  Tablet. 600 milliGRAM(s) Oral every 6 hours  oxytocin Infusion 333.333 milliUNIT(s)/Min (1000 mL/Hr) IV Continuous <Continuous>  oxytocin Infusion 333.333 milliUNIT(s)/Min (1000 mL/Hr) IV Continuous <Continuous>  prenatal multivitamin 1 Tablet(s) Oral daily    MEDICATIONS  (PRN):  benzocaine 20%/menthol 0.5% Spray 1 Spray(s) Topical every 6 hours PRN for Perineal discomfort  dibucaine 1% Ointment 1 Application(s) Topical every 6 hours PRN Perineal discomfort  diphenhydrAMINE 25 milliGRAM(s) Oral every 6 hours PRN Pruritus  hydrocortisone 1% Cream 1 Application(s) Topical every 6 hours PRN Moderate Pain (4-6)  lanolin Ointment 1 Application(s) Topical every 6 hours PRN nipple soreness  magnesium hydroxide Suspension 30 milliLiter(s) Oral two times a day PRN Constipation  oxyCODONE    IR 5 milliGRAM(s) Oral every 3 hours PRN Moderate to Severe Pain (4-10)  oxyCODONE    IR 5 milliGRAM(s) Oral once PRN Moderate to Severe Pain (4-10)  pramoxine 1%/zinc 5% Cream 1 Application(s) Topical every 4 hours PRN Moderate Pain (4-6)  simethicone 80 milliGRAM(s) Chew every 4 hours PRN Gas  witch hazel Pads 1 Application(s) Topical every 4 hours PRN Perineal discomfort      PHYSICAL EXAM:    Extremities: non-tender bilaterally.   Abdomen: soft, nontender, fundus firm

## 2021-02-16 ENCOUNTER — APPOINTMENT (OUTPATIENT)
Dept: OBGYN | Facility: CLINIC | Age: 26
End: 2021-02-16

## 2021-02-16 LAB — GLUCOSE BLDC GLUCOMTR-MCNC: 98 MG/DL — SIGNIFICANT CHANGE UP (ref 70–99)

## 2021-02-17 ENCOUNTER — APPOINTMENT (OUTPATIENT)
Dept: OBGYN | Facility: CLINIC | Age: 26
End: 2021-02-17

## 2021-02-19 ENCOUNTER — NON-APPOINTMENT (OUTPATIENT)
Age: 26
End: 2021-02-19

## 2021-02-22 ENCOUNTER — NON-APPOINTMENT (OUTPATIENT)
Age: 26
End: 2021-02-22

## 2021-03-29 ENCOUNTER — APPOINTMENT (OUTPATIENT)
Dept: MATERNAL FETAL MEDICINE | Facility: CLINIC | Age: 26
End: 2021-03-29
Payer: MEDICARE

## 2021-03-29 PROBLEM — D21.9 BENIGN NEOPLASM OF CONNECTIVE AND OTHER SOFT TISSUE, UNSPECIFIED: Chronic | Status: ACTIVE | Noted: 2021-02-12

## 2021-03-29 PROCEDURE — G0108 DIAB MANAGE TRN  PER INDIV: CPT | Mod: 95

## 2021-03-31 ENCOUNTER — APPOINTMENT (OUTPATIENT)
Dept: OBGYN | Facility: CLINIC | Age: 26
End: 2021-03-31
Payer: MEDICARE

## 2021-03-31 VITALS
SYSTOLIC BLOOD PRESSURE: 118 MMHG | DIASTOLIC BLOOD PRESSURE: 76 MMHG | HEIGHT: 60 IN | WEIGHT: 171 LBS | BODY MASS INDEX: 33.57 KG/M2

## 2021-03-31 PROCEDURE — 0503F POSTPARTUM CARE VISIT: CPT

## 2021-04-04 LAB — CYTOLOGY CVX/VAG DOC THIN PREP: NORMAL

## 2021-08-11 NOTE — DISCHARGE NOTE OB - USE WARM WATER SITZ BATH FOR RELIEF OF DISCOMFORT
Acute Care - Speech Language Pathology   Swallow Re-Evaluation CHRISTOPHER Gardner     Patient Name: Flores Marie  : 1966  MRN: [id number]  Today's Date: 2021               Admit Date: 2021    Visit Dx:   No diagnosis found.  Patient Active Problem List   Diagnosis   • Acute on chronic respiratory failure with hypoxia and hypercapnia (CMS/HCC)   • Essential hypertension   • Chronic bronchitis (CMS/HCC)   • Community acquired pneumonia of right lower lobe of lung   • Current smoker   • Substance abuse (CMS/HCC)   • Interstitial lung disease (CMS/HCC)     Past Medical History:   Diagnosis Date   • Arthritis    • Asthma    • COPD (chronic obstructive pulmonary disease) (CMS/HCC)    • Elevated cholesterol    • Hypertension    • Stroke (CMS/HCC)      History reviewed. No pertinent surgical history.    SLP Recommendation and Plan  SLP Swallowing Diagnosis: functional oral phase, functional pharyngeal phase, other (see comments) (concerns for possible pharyngeal involvement due to anxiety)  SLP Diet Recommendation: soft textures, ground, thin liquids, nutritional supplements needed  Recommended Precautions and Strategies: upright posture during/after eating, general aspiration precautions, reflux precautions, fatigue precautions  SLP Rec. for Method of Medication Administration: meds whole, meds crushed, with pudding or applesauce, as tolerated     Monitor for Signs of Aspiration: no, notify SLP if any concerns     Swallow Criteria for Skilled Therapeutic Interventions Met: demonstrates skilled criteria  Anticipated Discharge Disposition (SLP): unknown  Rehab Potential/Prognosis, Swallowing: re-evaluate goals as necessary  Therapy Frequency (Swallow): PRN  Predicted Duration Therapy Intervention (Days): 1 week, until discharge     Daily Summary of Progress (SLP): progress towards functional goals is fair    Plan for Continued Treatment (SLP): Pt with improved tolerance of po and improved vocal quality. See  clinical swallow eval summary for results. Will continue to follow for diet tolerance.    Patient/Family Concerns, Anticipated Discharge Disposition (SLP): Pt does not report any concerns at this time. Very limited interactions overall.          Plan of Care Reviewed With: patient, other (see comments) (RNs, Leif and Alisha; MD, Dr. Vargas)  Outcome Summary: SLP: Pt continues w/limited po intake but functional appearing oropharyngeal swallow. Pt with no overt s/s of aspiration on thins from cup, straw, puree or small bite of solid. Reports fear of choking but also reports no history of swallowing problems. She is demonstrating significant anxiety at this time. Recommend to start a soft ground diet with thin liquids and meds whole or crushed as tolerated in puree. Aspiration precautions, oral care before breakfast and after meals. SLP will follow up for meal assessment as tolerated.         SWALLOW EVALUATION (last 72 hours)      SLP Adult Swallow Evaluation     Row Name 08/11/21 0833 08/10/21 8601       Rehab Evaluation    Document Type  re-evaluation  -AD  evaluation  -AD    Subjective Information  complains of anxiety  -AD  no complaints  -AD    Patient Observations  alert;agree to therapy cooperative but anxious  -AD  decreased LOC semi cooperative with confusion/difficulty following command  -AD    Patient/Family/Caregiver Comments/Observations  Pt seen upright in bed, alert but anxious. Fearful of eating too much. Needs strong encouragement to participate.   -AD  Pt seen upright in bed, very fidgety and difficulty maintaining attention to task at times. Difficulty following commands at times requiring frequent repetitions. Pt pulling off BP cuff and refusing to take further trials of po to complete exam.   -AD    Care Plan Review  evaluation/treatment results reviewed;care plan/treatment goals reviewed;risks/benefits reviewed;current/potential barriers reviewed;patient/other agree to care plan  -AD   evaluation/treatment results reviewed;care plan/treatment goals reviewed;risks/benefits reviewed;current/potential barriers reviewed  -AD    Care Plan Review, Other Participant(s)  other (see comments) RNs, Alisha and Leif; MD, Dr. Vargas  -AD  family;other (see comments) RNAlisha; MD, Dr. Diaz  -AD    Patient Effort  fair  -AD  poor  -AD    Symptoms Noted During/After Treatment  other (see comments) anxious; RN aware  -AD  other (see comments) fatigue/confusion  -AD       General Information    Patient Profile Reviewed  yes  -AD  yes  -AD    Pertinent History Of Current Problem  No changes in history from prior eval on 8/10/21.  -AD  Pt is a 56 y/o female diagnosed with acute encephalopathy, acute respiratory failure likely chronic who required intubation at [facility] in the ER and was transferred here for further care. Pt intubated on 8/7/21 and extubated today (8/10/21). Currently with pneumonia and leukocytosis. Hx of illecit drug use, possible CVA but not conclusive in May of this year w/right sided weakness and parasthesias. Hx of alcohol abuse also reported. Last use was reportedly prior to her admission for CVA. Pt was receiving NG tube feedings but removed her tube today after being extubated.   -AD    Current Method of Nutrition  NPO  -AD  NPO  -AD    Precautions/Limitations, Vision  WFL;for purposes of eval  -AD  other (see comments) unable to assess; no gross deficits noted; does track/focus  -AD    Precautions/Limitations, Hearing  WFL;for purposes of eval  -AD  other (see comments) difficult to assess  -AD    Prior Level of Function-Communication  cognitive-linguistic impairment  -AD  cognitive-linguistic impairment;other (see comments) suspected  -AD    Prior Level of Function-Swallowing  no diet consistency restrictions;other (see comments)  -AD  no diet consistency restrictions;other (see comments) reported; RN states family reports swallowing difficulties  -AD    Plans/Goals Discussed with   patient;agreed upon  -AD  family;agreed upon;other (see comments) RN  -AD    Barriers to Rehab  cognitive status;ineffective coping  -AD  cognitive status  -AD    Patient's Goals for Discharge  patient did not state  -AD  patient could not state  -AD    Family Goals for Discharge  patient able to return to PO diet per Fadia on 8/10/21  -AD  patient able to return to PO diet;patient able to eat/drink without coughing/choking  -AD       Pain    Additional Documentation  -- no pain reported  -AD  -- no pain reported  -AD       Oral Motor Structure and Function    Oral Lesions or Structural Abnormalities and/or variants  --  reddened posterior pharyngeal wall  -AD    Dentition Assessment  --  missing teeth;teeth are in poor condition several missing-upper left/lower right molars ; decay/broken  -AD    Secretion Management  --  other (see comments) dry oral mucosa  -AD    Mucosal Quality  --  dry  -AD    Volitional Swallow  --  delayed  -AD    Volitional Cough  --  non-productive;weak;reduced respiratory support  -AD       Oral Musculature and Cranial Nerve Assessment    Oral Motor General Assessment  --  generalized oral motor weakness;vocal impairment  -AD    Lingual Impairment, Detail. Cranial Nerves IX, XII (Glossopharyngeal and Hypoglossal)  --  reduced lingual ROM;reduced strength;bilaterally  -AD    Vocal Impairment, Detail. Cranial Nerve X (Vagus)  --  vocal quality abnormality (see comments);impaired throat clear/cough (see comments)  -AD    Oral Motor, Comment  --  Pt with generalized weakness and decreased range of the tongue. Slow responses w/hoarse vocal quality. Weak, nonproductive cough with reduced respiratory support noted.   -AD       General Eating/Swallowing Observations    Respiratory Support Currently in Use  nasal cannula 1L  -AD  nasal cannula  -AD    Eating/Swallowing Skills  self-fed;appropriate self-feeding skills observed;other (see comments) extremely limited in oral intake requiring  consistent cues  -AD  fed by SLP  -AD    Positioning During Eating  upright 90 degree;upright in chair  -AD  upright 90 degree;upright in chair  -AD    Utensils Used  spoon;cup;straw  -AD  spoon  -AD    Consistencies Trialed  regular textures;pureed;thin liquids  -AD  ice chips;thin liquids  -AD    Pre SpO2 (%)  --  94  -AD    Post SpO2 (%)  --  90  -AD       Respiratory    Respiratory Status  increase in respiratory rate;other (see comments) due to anxiety  -AD  reduction in SpO2;during swallowing/eating  -AD    Respiratory Pattern  decrease control/incoordination of breathing swallow  -AD  decrease control/incoordination of breathing swallow  -AD    Date of Intubation  8/7/21 extubated 8/10/21  -AD  8/7/21 extubated 8/10/21  -AD       Clinical Swallow Eval    Oral Prep Phase  WFL  -AD  impaired  -AD    Oral Transit  WFL  -AD  impaired  -AD    Oral Residue  WFL  -AD  WFL  -AD    Pharyngeal Phase  no overt signs/symptoms of pharyngeal impairment  -AD  suspected pharyngeal impairment  -AD    Esophageal Phase  unremarkable  -AD  unremarkable  -AD    Clinical Swallow Evaluation Summary  Pt demonstrates a functional oropharyngeal swallow, but severly limited on intake and requires consistent encouragement to take larger bites and drinks for evaluation purposes. Pt with no difficulty with oral prep, transit time WFL w/no delay. No oral residue. No overt s/s of aspiration including coughing, choking or strangling. Vocal quality remains clear and improved from prior visit. Pt does demonstrate increased respiratory rate with anxiety which could impact swallowing. Recommend to start a soft diet with ground meats and thin liquids and monitor status. Meds whole or crushed in applesauce. This may improve respiration if home medications can be given for anxiety. SLP to follow up with full meal assessment.   -AD  Limited evaluation with ice chips and one trial of water by spoon. Pt with decreased labial seal and anterior loss of  ice chips. Pt with consistent throat clearing after trials of ice and thins by spoon. Pt refusing further trials of thins or further trials of po at this time. RN reports frequent throat clearing throughout the day since extubation.   -AD       Oral Prep Concerns    Oral Prep Concerns  --  incomplete or weak lip closure around spoon;anterior loss;spits out food prior to swallow  -AD    Incomplete or Weak Lip Closure Around Spoon  --  thin  -AD    Anterior Loss  --  thin  -AD    Spits Out Food Prior to Swallow  --  thin  -AD       Oral Transit Concerns    Oral Transit Concerns  --  increased oral transit time  -AD    Increased Oral Transit Time  --  thin  -AD       Pharyngeal Phase Concerns    Pharyngeal Phase Concerns  --  throat clear  -AD    Throat Clear  --  thin  -AD       Clinical Impression    Daily Summary of Progress (SLP)  progress towards functional goals is fair  -AD  --    Barriers to Overall Progress (SLP)  Anxiety  -AD  --    SLP Swallowing Diagnosis  functional oral phase;functional pharyngeal phase;other (see comments) concerns for possible pharyngeal involvement due to anxiety  -AD  mild-moderate;oral dysphagia;suspected pharyngeal dysphagia  -AD    Functional Impact  risk of aspiration/pneumonia;risk of malnutrition;risk of dehydration  -AD  risk of aspiration/pneumonia;risk of malnutrition;risk of dehydration  -AD    Rehab Potential/Prognosis, Swallowing  re-evaluate goals as necessary  -AD  re-evaluate goals as necessary  -AD    Swallow Criteria for Skilled Therapeutic Interventions Met  demonstrates skilled criteria  -AD  demonstrates skilled criteria  -AD    Plan for Continued Treatment (SLP)  Pt with improved tolerance of po and improved vocal quality. See clinical swallow eval summary for results. Will continue to follow for diet tolerance.  -AD  --       Recommendations    Therapy Frequency (Swallow)  PRN  -AD  PRN  -AD    Predicted Duration Therapy Intervention (Days)  1 week;until  discharge  -AD  1 week;until discharge  -AD    SLP Diet Recommendation  soft textures;ground;thin liquids;nutritional supplements needed  -AD  NPO;other (see comments) ice chips w/supervision, one at a time  -AD    Recommended Diagnostics  --  reassess via clinical swallow evaluation  -AD    Recommended Precautions and Strategies  upright posture during/after eating;general aspiration precautions;reflux precautions;fatigue precautions  -AD  general aspiration precautions;reflux precautions;fatigue precautions;1:1 supervision  -AD    Oral Care Recommendations  Oral Care BID/PRN;Toothbrush  -AD  Oral Care BID/PRN;Toothbrush  -AD    SLP Rec. for Method of Medication Administration  meds whole;meds crushed;with pudding or applesauce;as tolerated  -AD  meds via alternate route  -AD    Monitor for Signs of Aspiration  no;notify SLP if any concerns  -AD  yes;cough;throat clearing  -AD    Anticipated Discharge Disposition (SLP)  unknown  -AD  unknown  -AD    Patient/Family Concerns, Anticipated Discharge Disposition (SLP)  Pt does not report any concerns at this time. Very limited interactions overall.   -AD  No concerns reported per family present in the room (Fadia).   -AD       Swallow Goals (SLP)    Oral Nutrition/Hydration Goal Selection (SLP)  oral nutrition/hydration, SLP goal 2  -AD  oral nutrition/hydration, SLP goal 1  -AD       Oral Nutrition/Hydration Goal 1 (SLP)    Oral Nutrition/Hydration Goal 1, SLP  Pt will participate in a reassessment of her swallowing at bedside to assess for safety of po intake.   -AD  Pt will participate in a reassessment of her swallowing at bedside to assess for safety of po intake.   -AD    Time Frame (Oral Nutrition/Hydration Goal 1, SLP)  short term goal (STG);2 days  -AD  short term goal (STG);2 days  -AD    Barriers (Oral Nutrition/Hydration Goal 1, SLP)  medically complex; decreased cooperation  -AD  medically complex; decreased cooperation  -AD    Progress/Outcomes (Oral  Nutrition/Hydration Goal 1, SLP)  goal met  -AD  goal ongoing  -AD       Oral Nutrition/Hydration Goal 2 (SLP)    Oral Nutrition/Hydration Goal 2, SLP  Pt will be able to tolerate the least restrictive diet without clinical s/s of aspiration or complications such as aspiration pneumonia.   -AD  --    Time Frame (Oral Nutrition/Hydration Goal 2, SLP)  short term goal (STG);3 days;by discharge  -AD  --    Barriers (Oral Nutrition/Hydration Goal 2, SLP)  anxiety  -AD  --    Progress/Outcomes (Oral Nutrition/Hydration Goal 2, SLP)  continuing progress toward goal;goal ongoing;other (see comments) diet advanced to soft, grd and thins  -AD  --      User Key  (r) = Recorded By, (t) = Taken By, (c) = Cosigned By    Initials Name Effective Dates    Gemma Ramos, MS CCC-SLP 06/16/21 -           EDUCATION  The patient has been educated in the following areas:   Dysphagia (Swallowing Impairment) Modified Diet Instruction. Pt nods head in agreement with understanding. Reinforcement due to confusion and unclear if pt fully understanding all results and recommendations.       SLP GOALS     Row Name 08/11/21 0833 08/10/21 1530          Oral Nutrition/Hydration Goal 1 (SLP)    Oral Nutrition/Hydration Goal 1, SLP  Pt will participate in a reassessment of her swallowing at bedside to assess for safety of po intake.   -AD  Pt will participate in a reassessment of her swallowing at bedside to assess for safety of po intake.   -AD     Time Frame (Oral Nutrition/Hydration Goal 1, SLP)  short term goal (STG);2 days  -AD  short term goal (STG);2 days  -AD     Barriers (Oral Nutrition/Hydration Goal 1, SLP)  medically complex; decreased cooperation  -AD  medically complex; decreased cooperation  -AD     Progress/Outcomes (Oral Nutrition/Hydration Goal 1, SLP)  goal met  -AD  goal ongoing  -AD        Oral Nutrition/Hydration Goal 2 (SLP)    Oral Nutrition/Hydration Goal 2, SLP  Pt will be able to tolerate the least restrictive diet  without clinical s/s of aspiration or complications such as aspiration pneumonia.   -AD  --     Time Frame (Oral Nutrition/Hydration Goal 2, SLP)  short term goal (STG);3 days;by discharge  -AD  --     Barriers (Oral Nutrition/Hydration Goal 2, SLP)  anxiety  -AD  --     Progress/Outcomes (Oral Nutrition/Hydration Goal 2, SLP)  continuing progress toward goal;goal ongoing;other (see comments) diet advanced to soft, grd and thins  -AD  --       User Key  (r) = Recorded By, (t) = Taken By, (c) = Cosigned By    Initials Name Provider Type    Gemma Ramos MS CCC-SLP Speech and Language Pathologist             Time Calculation:   Time Calculation- SLP     Row Name 08/11/21 1047             Time Calculation- SLP    SLP Start Time  0810  -AD      SLP Stop Time  0833  -AD      SLP Time Calculation (min)  23 min  -AD      Total Timed Code Minutes- SLP  0 minute(s)  -AD      SLP Non-Billable Time (min)  0 min  -AD      SLP Received On  08/11/21  -AD         Untimed Charges    32881-EB Treatment Swallow Minutes  23  -AD         Total Minutes    Untimed Charges Total Minutes  23  -AD       Total Minutes  23  -AD        User Key  (r) = Recorded By, (t) = Taken By, (c) = Cosigned By    Initials Name Provider Type    Gemma Ramos MS CCC-SLP Speech and Language Pathologist          Therapy Charges for Today     Code Description Service Date Service Provider Modifiers Qty    51180999722 HC ST EVAL ORAL PHARYNG SWALLOW 2 8/10/2021 Gemma Sierra MS CCC-SLP GN 1    78264167245 HC ST TREATMENT SWALLOW 2 8/11/2021 Gemma Sierra MS CCC-SLP GN 1               MS CHARLES Sosa  8/11/2021   Statement Selected

## 2021-11-06 ENCOUNTER — TRANSCRIPTION ENCOUNTER (OUTPATIENT)
Age: 26
End: 2021-11-06

## 2022-03-03 NOTE — DISCHARGE NOTE OB - CRACKED, BLEEDING NIPPLES
----- Message from Jeannette Barroso MD sent at 3/2/2022  5:18 PM EST -----  Regarding: Optifast 4:1  Please call and email Optifast 4:1 meal plan. Thank you. Statement Selected

## 2023-11-29 ENCOUNTER — APPOINTMENT (OUTPATIENT)
Dept: ANTEPARTUM | Facility: CLINIC | Age: 28
End: 2023-11-29
Payer: COMMERCIAL

## 2023-11-29 ENCOUNTER — ASOB RESULT (OUTPATIENT)
Age: 28
End: 2023-11-29

## 2023-11-29 PROCEDURE — 76805 OB US >/= 14 WKS SNGL FETUS: CPT

## 2023-12-20 ENCOUNTER — ASOB RESULT (OUTPATIENT)
Age: 28
End: 2023-12-20

## 2023-12-20 ENCOUNTER — APPOINTMENT (OUTPATIENT)
Dept: ANTEPARTUM | Facility: CLINIC | Age: 28
End: 2023-12-20
Payer: COMMERCIAL

## 2023-12-20 PROCEDURE — 76811 OB US DETAILED SNGL FETUS: CPT

## 2024-05-09 ENCOUNTER — APPOINTMENT (OUTPATIENT)
Dept: ANTEPARTUM | Facility: CLINIC | Age: 29
End: 2024-05-09

## 2024-05-10 ENCOUNTER — INPATIENT (INPATIENT)
Facility: HOSPITAL | Age: 29
LOS: 0 days | Discharge: HOME | End: 2024-05-10
Attending: OBSTETRICS & GYNECOLOGY | Admitting: OBSTETRICS & GYNECOLOGY

## 2024-05-10 ENCOUNTER — TRANSCRIPTION ENCOUNTER (OUTPATIENT)
Age: 29
End: 2024-05-10

## 2024-05-10 VITALS
TEMPERATURE: 98 F | HEART RATE: 78 BPM | RESPIRATION RATE: 18 BRPM | SYSTOLIC BLOOD PRESSURE: 115 MMHG | DIASTOLIC BLOOD PRESSURE: 57 MMHG

## 2024-05-10 DIAGNOSIS — O26.899 OTHER SPECIFIED PREGNANCY RELATED CONDITIONS, UNSPECIFIED TRIMESTER: ICD-10-CM

## 2024-05-10 LAB
BASOPHILS # BLD AUTO: 0.05 K/UL — SIGNIFICANT CHANGE UP (ref 0–0.2)
BASOPHILS NFR BLD AUTO: 0.4 % — SIGNIFICANT CHANGE UP (ref 0–2)
BLD GP AB SCN SERPL QL: NEGATIVE — SIGNIFICANT CHANGE UP
EOSINOPHIL # BLD AUTO: 0.13 K/UL — SIGNIFICANT CHANGE UP (ref 0–0.5)
EOSINOPHIL NFR BLD AUTO: 1.2 % — SIGNIFICANT CHANGE UP (ref 0–6)
HCT VFR BLD CALC: 33.9 % — LOW (ref 34.5–45)
HGB BLD-MCNC: 11.5 G/DL — SIGNIFICANT CHANGE UP (ref 11.5–15.5)
IANC: 8.19 K/UL — HIGH (ref 1.8–7.4)
IMM GRANULOCYTES NFR BLD AUTO: 1.3 % — HIGH (ref 0–0.9)
LYMPHOCYTES # BLD AUTO: 1.94 K/UL — SIGNIFICANT CHANGE UP (ref 1–3.3)
LYMPHOCYTES # BLD AUTO: 17.2 % — SIGNIFICANT CHANGE UP (ref 13–44)
MCHC RBC-ENTMCNC: 28.9 PG — SIGNIFICANT CHANGE UP (ref 27–34)
MCHC RBC-ENTMCNC: 33.9 GM/DL — SIGNIFICANT CHANGE UP (ref 32–36)
MCV RBC AUTO: 85.2 FL — SIGNIFICANT CHANGE UP (ref 80–100)
MONOCYTES # BLD AUTO: 0.84 K/UL — SIGNIFICANT CHANGE UP (ref 0–0.9)
MONOCYTES NFR BLD AUTO: 7.4 % — SIGNIFICANT CHANGE UP (ref 2–14)
NEUTROPHILS # BLD AUTO: 8.19 K/UL — HIGH (ref 1.8–7.4)
NEUTROPHILS NFR BLD AUTO: 72.5 % — SIGNIFICANT CHANGE UP (ref 43–77)
NRBC # BLD: 0 /100 WBCS — SIGNIFICANT CHANGE UP (ref 0–0)
NRBC # FLD: 0 K/UL — SIGNIFICANT CHANGE UP (ref 0–0)
PLATELET # BLD AUTO: 265 K/UL — SIGNIFICANT CHANGE UP (ref 150–400)
RBC # BLD: 3.98 M/UL — SIGNIFICANT CHANGE UP (ref 3.8–5.2)
RBC # FLD: 13.7 % — SIGNIFICANT CHANGE UP (ref 10.3–14.5)
RH IG SCN BLD-IMP: POSITIVE — SIGNIFICANT CHANGE UP
RH IG SCN BLD-IMP: POSITIVE — SIGNIFICANT CHANGE UP
T PALLIDUM AB TITR SER: NEGATIVE — SIGNIFICANT CHANGE UP
WBC # BLD: 11.3 K/UL — HIGH (ref 3.8–10.5)
WBC # FLD AUTO: 11.3 K/UL — HIGH (ref 3.8–10.5)

## 2024-05-10 RX ORDER — DIPHENHYDRAMINE HCL 50 MG
25 CAPSULE ORAL EVERY 6 HOURS
Refills: 0 | Status: DISCONTINUED | OUTPATIENT
Start: 2024-05-10 | End: 2024-05-10

## 2024-05-10 RX ORDER — SODIUM CHLORIDE 9 MG/ML
3 INJECTION INTRAMUSCULAR; INTRAVENOUS; SUBCUTANEOUS EVERY 8 HOURS
Refills: 0 | Status: DISCONTINUED | OUTPATIENT
Start: 2024-05-10 | End: 2024-05-10

## 2024-05-10 RX ORDER — BENZOCAINE 10 %
1 GEL (GRAM) MUCOUS MEMBRANE EVERY 6 HOURS
Refills: 0 | Status: DISCONTINUED | OUTPATIENT
Start: 2024-05-10 | End: 2024-05-10

## 2024-05-10 RX ORDER — HYDROCORTISONE 1 %
1 OINTMENT (GRAM) TOPICAL EVERY 6 HOURS
Refills: 0 | Status: DISCONTINUED | OUTPATIENT
Start: 2024-05-10 | End: 2024-05-10

## 2024-05-10 RX ORDER — PRAMOXINE HYDROCHLORIDE 150 MG/15G
1 AEROSOL, FOAM RECTAL EVERY 4 HOURS
Refills: 0 | Status: DISCONTINUED | OUTPATIENT
Start: 2024-05-10 | End: 2024-05-10

## 2024-05-10 RX ORDER — SIMETHICONE 80 MG/1
80 TABLET, CHEWABLE ORAL EVERY 4 HOURS
Refills: 0 | Status: DISCONTINUED | OUTPATIENT
Start: 2024-05-10 | End: 2024-05-10

## 2024-05-10 RX ORDER — TETANUS TOXOID, REDUCED DIPHTHERIA TOXOID AND ACELLULAR PERTUSSIS VACCINE, ADSORBED 5; 2.5; 8; 8; 2.5 [IU]/.5ML; [IU]/.5ML; UG/.5ML; UG/.5ML; UG/.5ML
0.5 SUSPENSION INTRAMUSCULAR ONCE
Refills: 0 | Status: DISCONTINUED | OUTPATIENT
Start: 2024-05-10 | End: 2024-05-10

## 2024-05-10 RX ORDER — ERGOCALCIFEROL 1.25 MG/1
0 CAPSULE ORAL
Refills: 0 | DISCHARGE

## 2024-05-10 RX ORDER — LANOLIN
1 OINTMENT (GRAM) TOPICAL EVERY 6 HOURS
Refills: 0 | Status: DISCONTINUED | OUTPATIENT
Start: 2024-05-10 | End: 2024-05-10

## 2024-05-10 RX ORDER — SODIUM CHLORIDE 9 MG/ML
1000 INJECTION, SOLUTION INTRAVENOUS
Refills: 0 | Status: DISCONTINUED | OUTPATIENT
Start: 2024-05-10 | End: 2024-05-10

## 2024-05-10 RX ORDER — MAGNESIUM HYDROXIDE 400 MG/1
30 TABLET, CHEWABLE ORAL
Refills: 0 | Status: DISCONTINUED | OUTPATIENT
Start: 2024-05-10 | End: 2024-05-10

## 2024-05-10 RX ORDER — KETOROLAC TROMETHAMINE 30 MG/ML
30 SYRINGE (ML) INJECTION ONCE
Refills: 0 | Status: DISCONTINUED | OUTPATIENT
Start: 2024-05-10 | End: 2024-05-10

## 2024-05-10 RX ORDER — OXYCODONE HYDROCHLORIDE 5 MG/1
5 TABLET ORAL
Refills: 0 | Status: DISCONTINUED | OUTPATIENT
Start: 2024-05-10 | End: 2024-05-10

## 2024-05-10 RX ORDER — OXYCODONE HYDROCHLORIDE 5 MG/1
5 TABLET ORAL ONCE
Refills: 0 | Status: DISCONTINUED | OUTPATIENT
Start: 2024-05-10 | End: 2024-05-10

## 2024-05-10 RX ORDER — IBUPROFEN 200 MG
600 TABLET ORAL EVERY 6 HOURS
Refills: 0 | Status: DISCONTINUED | OUTPATIENT
Start: 2024-05-10 | End: 2024-05-10

## 2024-05-10 RX ORDER — ACETAMINOPHEN 500 MG
975 TABLET ORAL
Refills: 0 | Status: DISCONTINUED | OUTPATIENT
Start: 2024-05-10 | End: 2024-05-10

## 2024-05-10 RX ORDER — IBUPROFEN 200 MG
600 TABLET ORAL EVERY 6 HOURS
Refills: 0 | Status: COMPLETED | OUTPATIENT
Start: 2024-05-10 | End: 2025-04-08

## 2024-05-10 RX ORDER — AER TRAVELER 0.5 G/1
1 SOLUTION RECTAL; TOPICAL EVERY 4 HOURS
Refills: 0 | Status: DISCONTINUED | OUTPATIENT
Start: 2024-05-10 | End: 2024-05-10

## 2024-05-10 RX ORDER — DIBUCAINE 1 %
1 OINTMENT (GRAM) RECTAL EVERY 6 HOURS
Refills: 0 | Status: DISCONTINUED | OUTPATIENT
Start: 2024-05-10 | End: 2024-05-10

## 2024-05-10 RX ORDER — CHLORHEXIDINE GLUCONATE 213 G/1000ML
1 SOLUTION TOPICAL DAILY
Refills: 0 | Status: DISCONTINUED | OUTPATIENT
Start: 2024-05-10 | End: 2024-05-10

## 2024-05-10 RX ORDER — OXYTOCIN 10 UNIT/ML
333.33 VIAL (ML) INJECTION
Qty: 20 | Refills: 0 | Status: DISCONTINUED | OUTPATIENT
Start: 2024-05-10 | End: 2024-05-10

## 2024-05-10 RX ORDER — OXYTOCIN 10 UNIT/ML
41.67 VIAL (ML) INJECTION
Qty: 20 | Refills: 0 | Status: DISCONTINUED | OUTPATIENT
Start: 2024-05-10 | End: 2024-05-10

## 2024-05-10 RX ADMIN — Medication 30 MILLIGRAM(S): at 10:57

## 2024-05-10 RX ADMIN — SODIUM CHLORIDE 125 MILLILITER(S): 9 INJECTION, SOLUTION INTRAVENOUS at 06:53

## 2024-05-10 RX ADMIN — CHLORHEXIDINE GLUCONATE 1 APPLICATION(S): 213 SOLUTION TOPICAL at 04:00

## 2024-05-10 RX ADMIN — Medication 975 MILLIGRAM(S): at 20:20

## 2024-05-10 RX ADMIN — Medication 30 MILLIGRAM(S): at 10:27

## 2024-05-10 RX ADMIN — Medication 975 MILLIGRAM(S): at 21:00

## 2024-05-10 RX ADMIN — Medication 600 MILLIGRAM(S): at 23:56

## 2024-05-10 RX ADMIN — SODIUM CHLORIDE 3 MILLILITER(S): 9 INJECTION INTRAMUSCULAR; INTRAVENOUS; SUBCUTANEOUS at 22:06

## 2024-05-10 RX ADMIN — SODIUM CHLORIDE 3 MILLILITER(S): 9 INJECTION INTRAMUSCULAR; INTRAVENOUS; SUBCUTANEOUS at 14:19

## 2024-05-10 NOTE — DISCHARGE NOTE OB - PATIENT PORTAL LINK FT
You can access the FollowMyHealth Patient Portal offered by [facility] by registering at the following website: [email or web address]. By joining [facility]’s FollowMyHealth portal, you will also be able to view your health information using other applications (apps) compatible with our system.

## 2024-05-10 NOTE — OB PROVIDER LABOR PROGRESS NOTE - NS_SUBJECTIVE/OBJECTIVE_OBGYN_ALL_OB_FT
Labor & Delivery Progress Note     Pt seen & examined at bedside for complaints of rectal pressure    T(C): 36.9 (05-10-24 @ 06:00), Max: 36.9 (05-10-24 @ 06:00)  HR: 90 (05-10-24 @ 07:05) (73 - 103)  BP: 105/59 (05-10-24 @ 06:57) (90/49 - 139/89)  RR: 17 (05-10-24 @ 06:00) (17 - 18)  SpO2: 98% (05-10-24 @ 07:05) (94% - 100%)
Pt comfortable after epidural

## 2024-05-10 NOTE — OB RN DELIVERY SUMMARY - NS_SEPSISRSKCALC_OBGYN_ALL_OB_FT
EOS calculated successfully. EOS Risk Factor: 0.5/1000 live births ([facility] national incidence); GA=39w6d; Temp=98.42; ROM=0.15; GBS='Negative'; Antibiotics='No antibiotics or any antibiotics < 2 hrs prior to birth'

## 2024-05-10 NOTE — OB RN TRIAGE NOTE - HOW OFTEN DO YOU HAVE A DRINK CONTAINING ALCOHOL?
[facility]  [facility], [address], [address] [place] [address]  Ph: [phone] or [phone]  FAX: [phone]    Ed Tran M.D.  Kathy Soria M.D. Nicolle Paz M.D. Wellington Mccrary 6/28/1961    Complicated migraine 0/9233    [address] 4/9/2013    Yevgeniy Banuelos [address]     or autoimmune disorder    ADAMSON ([facility] Danita Stuart MD.    Electronically Signed: Constantino Peguero.  American Board of Epilepsy Never

## 2024-05-10 NOTE — OB PROVIDER DELIVERY SUMMARY - NSSELHIDDEN_OBGYN_ALL_OB_FT
[NS_DeliveryAttending1_OBGYN_ALL_OB_FT:[email or web address]],[NS_DeliveryRN_OBGYN_ALL_OB_FT:[email or web address]=]

## 2024-05-10 NOTE — OB RN PATIENT PROFILE - PRO INTERPRETER NEED 2
Your child was seen in the emergency department for a viral syndrome.  She was evaluated by our team.  Please be sure to use ibuprofen and Tylenol as needed for fever and pain.    Please have your child follow-up with their pediatrician within the next 1 to 2 days to ensure their symptoms are improving.    Please return to the emergency department for any new or concerning symptoms.  
English

## 2024-05-10 NOTE — DISCHARGE NOTE OB - NS MD DC FALL RISK RISK
For information on Fall & Injury Prevention, visit: [email or web address] OR  [email or web address] OR  https://www.cdc.gov/steadi/patient.html

## 2024-05-10 NOTE — OB PROVIDER TRIAGE NOTE - NSHPPHYSICALEXAM_GEN_ALL_CORE
Vital Signs Last 24 Hrs  T(C): 36.7 (10 May 2024 03:09), Max: 36.7 (10 May 2024 03:09)  T(F): 98.1 (10 May 2024 03:09), Max: 98.1 (10 May 2024 03:09)  HR: 80 (10 May 2024 03:19) (78 - 80)  BP: 112/62 (10 May 2024 03:19) (112/62 - 115/57)  RR: 18 (10 May 2024 03:09) (18 - 18)    Assessment reveals VSS  General: Female sitting comfortably in no apparent distress  Neuro: No facial asymmetry, no slurred speech, moves all 4 extremities  Mood: Alert and lucid, appropriate mood and affect  A&Ox3  Lungs- clear bilateral  Heart- normal rate and regular rhythm  Extremities- Warm, Dry, no edema present, good pulses   Abdomen soft, NT, gravid  Cat 1 tracing reactive, ctx every 5 mins  Transabdominal Ultrasound- images saved ASOB, vtx  Vaginal Exam- 6.5/80/-3        PLAN: Admit for Labor

## 2024-05-10 NOTE — DISCHARGE NOTE OB - CARE PLAN
1 Principal Discharge DX:	 (normal spontaneous vaginal delivery)  Assessment and plan of treatment:	Term pregnancy now delivered  Routine pp care

## 2024-05-10 NOTE — OB PROVIDER TRIAGE NOTE - NS_DILATION_OBGYN_ALL_OB_NU
Baby very unsettled under bili lights despite frequent feed of breastmilk and formula  Mother wanting to hold infant but knows the importance of the bililights  Baby swaddled in wallaby blanket, given to mother to hold and attempt to nurse baby again  Explained baby may be cluster feeding and encouraged feeds  Also instructed mother to return infant to bassinet and overhead lights once he's finished and fallen asleep  Mother agreed and verbalized understanding  Encouraged her to call for any assistance  6.5

## 2024-05-10 NOTE — OB PROVIDER DELIVERY SUMMARY - NSPROVIDERDELIVERYNOTE_OBGYN_ALL_OB_FT
Patient found to be fully dilated and directed to push.  Spontaneous vaginal delivery of viable liveborn male infant.   Head, shoulders, and body delivered without complications.  Infant was suctioned and passed to mother.   Delayed cord clamping performed, then clamped and cut.   Placenta delivered intact with a 3-vessel cord.   Fundal massage was given and uterine fundus was noted to be firm.   Vaginal exam revealed an intact cervix, vaginal walls, and sulci.   Patient has a  first degree laceration of the perineum  that was repaired with 2-0 chromic suture.   Excellent hemostasis noted.  Patient was stable and started postpartum recovery in room.   Count was correct x 2.     Infant: male  Apgar: 9/9  QBL: 116cc

## 2024-05-10 NOTE — OB PROVIDER H&P - HISTORY OF PRESENT ILLNESS
28y/o  @39.6wks presents with painful contractions felt every 5mins, pain scale 8/10  Reports good fetal movement  Denies LOF/VB    Allergies: Denies  Medications: PNV  [id number]

## 2024-05-10 NOTE — OB PROVIDER LABOR PROGRESS NOTE - ASSESSMENT
Plan:  29y P2 in labor   - Continue expectant management  - anticipate     Maggi Fung PGY-1
- continuous FHT  - mccullough catheter  - will reexamine as indicated  - anticipate

## 2024-05-10 NOTE — OB RN PATIENT PROFILE - FUNCTIONAL ASSESSMENT - DAILY ACTIVITY 3.
Two weeks off dairy - no milk, no cheese, no ice cream, no yogurt    Then, consider, two weeks of gluten           Patient Education   Personalized Prevention Plan  You are due for the preventive services outlined below.  Your care team is available to assist you in scheduling these services.  If you have already completed any of these items, please share that information with your care team to update in your medical record.  Health Maintenance Due   Topic Date Due     FALL RISK ASSESSMENT  04/30/2020     Flu Vaccine (1) 09/01/2020        
4 = No assist / stand by assistance

## 2024-05-10 NOTE — OB PROVIDER LABOR PROGRESS NOTE - NS_OBIHIFHRDETAILS_OBGYN_ALL_OB_FT
EFM: 120/mod. variability/+accels/-decels  Morrow: q2-4 min
FHT Cat 1, mod variability, +Accels, no decels

## 2024-05-10 NOTE — OB PROVIDER H&P - PROBLEM SELECTOR PLAN 1
Admit for Labor  D/W Dr. Lawson  Routine Orders  GBS Negative  Epidural for pain management   Expectant management     Risks, benefits, alternatives, and possible complications have been discussed in detail with the patient in her native language. Pre-admission, admission, and post admission procedures and expectations were discussed in detail. All questions answered, all appropriate hospital consents were signed. Anticipate normal vaginal delivery.   Informed consent was obtained. The following was discussed:   - Induction/augmentation of labor: use of medication and/or cook balloon to begin or enhance labor   - Obstetrical management including internal fetal/contraction monitoring   - Normal vaginal delivery   - Possible  section

## 2024-05-10 NOTE — OB PROVIDER TRIAGE NOTE - HISTORY OF PRESENT ILLNESS
30y/o  @39.6wks presents with painful contractions felt every 5mins, pain scale 8/10  Reports good fetal movement  Denies LOF/VB    Allergies: Denies  Medications: PNV  [id number]

## 2024-05-10 NOTE — DISCHARGE NOTE OB - CARE PROVIDER_API CALL
Munira Latif  Obstetrics and Gynecology  [address] [place] [id number]  Phone: [phone]  Follow Up Time:

## 2024-05-10 NOTE — OB PROVIDER TRIAGE NOTE - NS_ABDFINDING_OBGYN_ALL_OB
Soft, nontender
Implemented All Fall Risk Interventions:  [place] to call system. Call bell, personal items and telephone within reach. Instruct patient to call for assistance. Room bathroom lighting operational. Non-slip footwear when patient is off stretcher. Physically safe environment: no spills, clutter or unnecessary equipment. Stretcher in lowest position, wheels locked, appropriate side rails in place. Provide visual cue, wrist band, yellow gown, etc. Monitor gait and stability. Monitor for mental status changes and reorient to person, place, and time. Review medications for side effects contributing to fall risk. Reinforce activity limits and safety measures with patient and family.

## 2024-05-10 NOTE — DISCHARGE NOTE OB - CARE PROVIDERS DIRECT ADDRESSES
,[email or web address]

## 2024-05-10 NOTE — OB PROVIDER H&P - NSLOWPPHRISK_OBGYN_A_OB
No previous uterine incision/[place] Pregnancy/Less than or equal to 4 previous vaginal births/No known bleeding disorder/No history of postpartum hemorrhage/No other PPH risks indicated

## 2024-05-10 NOTE — OB RN DELIVERY SUMMARY - NSSELHIDDEN_OBGYN_ALL_OB_FT
[[email or web address]_ALL_OB_FT:[email or web address]=],[NS_DeliveryRN_OBGYN_ALL_OB_FT:[email or web address]=]

## 2024-05-11 VITALS
HEART RATE: 89 BPM | DIASTOLIC BLOOD PRESSURE: 52 MMHG | RESPIRATION RATE: 18 BRPM | OXYGEN SATURATION: 98 % | TEMPERATURE: 97 F | SYSTOLIC BLOOD PRESSURE: 104 MMHG

## 2024-05-11 RX ADMIN — Medication 975 MILLIGRAM(S): at 02:50

## 2024-05-11 RX ADMIN — Medication 600 MILLIGRAM(S): at 00:30

## 2024-05-11 RX ADMIN — SODIUM CHLORIDE 3 MILLILITER(S): 9 INJECTION INTRAMUSCULAR; INTRAVENOUS; SUBCUTANEOUS at 05:08

## 2024-05-11 RX ADMIN — Medication 600 MILLIGRAM(S): at 06:47

## 2024-05-11 RX ADMIN — Medication 600 MILLIGRAM(S): at 12:23

## 2024-05-11 RX ADMIN — Medication 975 MILLIGRAM(S): at 09:29

## 2024-05-11 RX ADMIN — Medication 1 TABLET(S): at 12:23

## 2024-05-11 RX ADMIN — Medication 600 MILLIGRAM(S): at 13:00

## 2024-05-11 RX ADMIN — Medication 975 MILLIGRAM(S): at 10:01

## 2024-05-11 RX ADMIN — Medication 600 MILLIGRAM(S): at 05:47

## 2024-05-11 RX ADMIN — Medication 975 MILLIGRAM(S): at 03:30

## 2024-05-11 NOTE — PROGRESS NOTE ADULT - SUBJECTIVE AND OBJECTIVE BOX
Post-partum Note,   She is a  29y woman who is now post-partum day: 1    Subjective:  The patient feels well.  She is ambulating.   She is tolerating regular diet.  She denies nausea and vomiting; denies fever.  She is voiding.  Her pain is controlled.  She reports normal postpartum bleeding.  She is breastfeeding.  She is formula feeding.    Physical exam:    Vital Signs Last 24 Hrs  T(C): 36.4 (11 May 2024 06:26), Max: 36.9 (10 May 2024 17:44)  T(F): 97.5 (11 May 2024 06:26), Max: 98.4 (10 May 2024 17:44)  HR: 69 (11 May 2024 06:26) (69 - 92)  BP: 112/68 (11 May 2024 06:26) (96/50 - 112/68)  BP(mean): 72 (10 May 2024 13:29) (72 - 72)  RR: 18 (11 May 2024 06:26) (16 - 18)  SpO2: 100% (11 May 2024 06:26) (100% - 100%)    Parameters below as of 11 May 2024 06:26  Patient On (Oxygen Delivery Method): room air        Gen: NAD  Breast: Soft, nontender, not engorged.  Abdomen: Soft, nontender, no distension , firm uterine fundus at umbilicus.  Pelvic: Normal lochia noted  Ext: No calf tenderness    LABS:                        11.5   11.30 )-----------( 265      ( 10 May 2024 04:00 )             33.9       Rubella status:     Allergies    No Known Allergies    Intolerances      MEDICATIONS  (STANDING):  acetaminophen     Tablet .. 975 milliGRAM(s) Oral <User Schedule>  diphtheria/tetanus/pertussis (acellular) Vaccine (Adacel) 0.5 milliLiter(s) IntraMuscular once  ibuprofen  Tablet. 600 milliGRAM(s) Oral every 6 hours  oxytocin Infusion 333.333 milliUNIT(s)/Min (1000 mL/Hr) IV Continuous <Continuous>  oxytocin Infusion 41.667 milliUNIT(s)/Min (125 mL/Hr) IV Continuous <Continuous>  prenatal multivitamin 1 Tablet(s) Oral daily  sodium chloride 0.9% lock flush 3 milliLiter(s) IV Push every 8 hours    MEDICATIONS  (PRN):  benzocaine 20%/menthol 0.5% Spray 1 Spray(s) Topical every 6 hours PRN for Perineal discomfort  dibucaine 1% Ointment 1 Application(s) Topical every 6 hours PRN Perineal discomfort  diphenhydrAMINE 25 milliGRAM(s) Oral every 6 hours PRN Pruritus  hydrocortisone 1% Cream 1 Application(s) Topical every 6 hours PRN Moderate Pain (4-6)  lanolin Ointment 1 Application(s) Topical every 6 hours PRN nipple soreness  magnesium hydroxide Suspension 30 milliLiter(s) Oral two times a day PRN Constipation  oxyCODONE    IR 5 milliGRAM(s) Oral every 3 hours PRN Moderate to Severe Pain (4-10)  oxyCODONE    IR 5 milliGRAM(s) Oral once PRN Moderate to Severe Pain (4-10)  pramoxine 1%/zinc 5% Cream 1 Application(s) Topical every 4 hours PRN Moderate Pain (4-6)  simethicone 80 milliGRAM(s) Chew every 4 hours PRN Gas  witch hazel Pads 1 Application(s) Topical every 4 hours PRN Perineal discomfort        Assessment and Plan  PPD #1 s/p   Doing well.  Encourage ambulation.  PP & PPD Instructions reviewed.  [facility].  D/C home today